# Patient Record
Sex: FEMALE | Race: BLACK OR AFRICAN AMERICAN | NOT HISPANIC OR LATINO | URBAN - METROPOLITAN AREA
[De-identification: names, ages, dates, MRNs, and addresses within clinical notes are randomized per-mention and may not be internally consistent; named-entity substitution may affect disease eponyms.]

---

## 2021-04-05 ENCOUNTER — APPOINTMENT (RX ONLY)
Dept: URBAN - METROPOLITAN AREA CLINIC 34 | Facility: CLINIC | Age: 66
Setting detail: DERMATOLOGY
End: 2021-04-05

## 2021-04-05 DIAGNOSIS — L30.4 ERYTHEMA INTERTRIGO: ICD-10-CM

## 2021-04-05 PROCEDURE — ? COUNSELING

## 2021-04-05 PROCEDURE — 99203 OFFICE O/P NEW LOW 30 MIN: CPT

## 2021-04-05 PROCEDURE — ? ADDITIONAL NOTES

## 2021-04-05 PROCEDURE — ? PRESCRIPTION

## 2021-04-05 RX ORDER — NYSTATIN AND TRIAMCINOLONE ACETONIDE 100000; 1 [USP'U]/G; MG/G
OINTMENT TOPICAL
Qty: 1 | Refills: 0 | Status: ERX | COMMUNITY
Start: 2021-04-05

## 2021-04-05 RX ADMIN — NYSTATIN AND TRIAMCINOLONE ACETONIDE: 100000; 1 OINTMENT TOPICAL at 00:00

## 2021-04-05 ASSESSMENT — LOCATION ZONE DERM
LOCATION ZONE: TRUNK
LOCATION ZONE: TRUNK

## 2021-04-05 ASSESSMENT — LOCATION SIMPLE DESCRIPTION DERM
LOCATION SIMPLE: GLUTEAL CLEFT
LOCATION SIMPLE: GLUTEAL CLEFT

## 2021-04-05 ASSESSMENT — LOCATION DETAILED DESCRIPTION DERM
LOCATION DETAILED: GLUTEAL CLEFT
LOCATION DETAILED: GLUTEAL CLEFT

## 2021-04-05 NOTE — PROCEDURE: ADDITIONAL NOTES
Render Risk Assessment In Note?: no
Additional Notes: Notes it’s a frictional rash present at site. Recommends Zeasorb powder. Can send in nystatin-Triamcinolone to apply bid for two weeks.
Detail Level: Simple

## 2022-04-12 ENCOUNTER — OFFICE VISIT (OUTPATIENT)
Dept: INTERNAL MEDICINE CLINIC | Age: 67
End: 2022-04-12
Payer: MEDICARE

## 2022-04-12 VITALS
OXYGEN SATURATION: 98 % | BODY MASS INDEX: 42.51 KG/M2 | SYSTOLIC BLOOD PRESSURE: 143 MMHG | HEIGHT: 64 IN | TEMPERATURE: 97.9 F | WEIGHT: 249 LBS | DIASTOLIC BLOOD PRESSURE: 79 MMHG | HEART RATE: 83 BPM | RESPIRATION RATE: 14 BRPM

## 2022-04-12 DIAGNOSIS — Z00.00 ROUTINE ADULT HEALTH MAINTENANCE: ICD-10-CM

## 2022-04-12 DIAGNOSIS — K76.0 HEPATIC STEATOSIS: ICD-10-CM

## 2022-04-12 DIAGNOSIS — G89.29 CHRONIC PAIN OF BOTH KNEES: Primary | ICD-10-CM

## 2022-04-12 DIAGNOSIS — M25.562 CHRONIC PAIN OF BOTH KNEES: Primary | ICD-10-CM

## 2022-04-12 DIAGNOSIS — M25.561 CHRONIC PAIN OF BOTH KNEES: Primary | ICD-10-CM

## 2022-04-12 DIAGNOSIS — R01.1 HEART MURMUR: ICD-10-CM

## 2022-04-12 DIAGNOSIS — E78.49 OTHER HYPERLIPIDEMIA: ICD-10-CM

## 2022-04-12 DIAGNOSIS — M72.2 PLANTAR FASCIITIS: ICD-10-CM

## 2022-04-12 DIAGNOSIS — I10 PRIMARY HYPERTENSION: ICD-10-CM

## 2022-04-12 DIAGNOSIS — M79.644 PAIN OF FINGER OF RIGHT HAND: ICD-10-CM

## 2022-04-12 DIAGNOSIS — K44.9 HIATAL HERNIA: ICD-10-CM

## 2022-04-12 DIAGNOSIS — E11.9 TYPE 2 DIABETES MELLITUS WITHOUT COMPLICATION, WITHOUT LONG-TERM CURRENT USE OF INSULIN (HCC): ICD-10-CM

## 2022-04-12 DIAGNOSIS — Z12.11 SCREENING FOR COLON CANCER: ICD-10-CM

## 2022-04-12 PROCEDURE — G8536 NO DOC ELDER MAL SCRN: HCPCS | Performed by: INTERNAL MEDICINE

## 2022-04-12 PROCEDURE — 2022F DILAT RTA XM EVC RTNOPTHY: CPT | Performed by: INTERNAL MEDICINE

## 2022-04-12 PROCEDURE — 3046F HEMOGLOBIN A1C LEVEL >9.0%: CPT | Performed by: INTERNAL MEDICINE

## 2022-04-12 PROCEDURE — G8510 SCR DEP NEG, NO PLAN REQD: HCPCS | Performed by: INTERNAL MEDICINE

## 2022-04-12 PROCEDURE — G8754 DIAS BP LESS 90: HCPCS | Performed by: INTERNAL MEDICINE

## 2022-04-12 PROCEDURE — 1090F PRES/ABSN URINE INCON ASSESS: CPT | Performed by: INTERNAL MEDICINE

## 2022-04-12 PROCEDURE — G8417 CALC BMI ABV UP PARAM F/U: HCPCS | Performed by: INTERNAL MEDICINE

## 2022-04-12 PROCEDURE — 3017F COLORECTAL CA SCREEN DOC REV: CPT | Performed by: INTERNAL MEDICINE

## 2022-04-12 PROCEDURE — G8400 PT W/DXA NO RESULTS DOC: HCPCS | Performed by: INTERNAL MEDICINE

## 2022-04-12 PROCEDURE — G8427 DOCREV CUR MEDS BY ELIG CLIN: HCPCS | Performed by: INTERNAL MEDICINE

## 2022-04-12 PROCEDURE — 99204 OFFICE O/P NEW MOD 45 MIN: CPT | Performed by: INTERNAL MEDICINE

## 2022-04-12 PROCEDURE — 1101F PT FALLS ASSESS-DOCD LE1/YR: CPT | Performed by: INTERNAL MEDICINE

## 2022-04-12 PROCEDURE — G8753 SYS BP > OR = 140: HCPCS | Performed by: INTERNAL MEDICINE

## 2022-04-12 RX ORDER — ATORVASTATIN CALCIUM 20 MG/1
20 TABLET, FILM COATED ORAL DAILY
COMMUNITY
End: 2022-04-12 | Stop reason: SDUPTHER

## 2022-04-12 RX ORDER — AMLODIPINE BESYLATE 10 MG/1
10 TABLET ORAL DAILY
Qty: 90 TABLET | Refills: 3 | Status: SHIPPED | OUTPATIENT
Start: 2022-04-12

## 2022-04-12 RX ORDER — METFORMIN HYDROCHLORIDE 500 MG/1
500 TABLET, EXTENDED RELEASE ORAL
COMMUNITY
End: 2022-04-12 | Stop reason: SDUPTHER

## 2022-04-12 RX ORDER — DICLOFENAC SODIUM 75 MG/1
75 TABLET, DELAYED RELEASE ORAL
Qty: 60 TABLET | Refills: 3 | Status: SHIPPED | OUTPATIENT
Start: 2022-04-12 | End: 2022-05-16

## 2022-04-12 RX ORDER — AMLODIPINE BESYLATE 10 MG/1
10 TABLET ORAL DAILY
COMMUNITY
End: 2022-04-12 | Stop reason: SDUPTHER

## 2022-04-12 RX ORDER — ATORVASTATIN CALCIUM 20 MG/1
20 TABLET, FILM COATED ORAL DAILY
Qty: 90 TABLET | Refills: 3 | Status: SHIPPED | OUTPATIENT
Start: 2022-04-12

## 2022-04-12 RX ORDER — LOSARTAN POTASSIUM AND HYDROCHLOROTHIAZIDE 25; 100 MG/1; MG/1
1 TABLET ORAL DAILY
Qty: 90 TABLET | Refills: 3 | Status: SHIPPED | OUTPATIENT
Start: 2022-04-12

## 2022-04-12 RX ORDER — METFORMIN HYDROCHLORIDE 500 MG/1
500 TABLET, EXTENDED RELEASE ORAL
Qty: 90 TABLET | Refills: 3 | Status: SHIPPED | OUTPATIENT
Start: 2022-04-12 | End: 2022-05-10 | Stop reason: SDUPTHER

## 2022-04-12 RX ORDER — LOSARTAN POTASSIUM AND HYDROCHLOROTHIAZIDE 25; 100 MG/1; MG/1
1 TABLET ORAL DAILY
COMMUNITY
End: 2022-04-12 | Stop reason: SDUPTHER

## 2022-04-12 NOTE — ASSESSMENT & PLAN NOTE
Jammed her finger a few weeks ago.   Still has swelling and difficulty fully extending DIP joint  Ortho referral provided

## 2022-04-12 NOTE — ASSESSMENT & PLAN NOTE
Metformin  qhs  Recent A1c 6.7 per patient. Was just started on Metformin. Continue Metformin 500 ER every night.   We discussed considering medications likely also help with weight

## 2022-04-12 NOTE — PROGRESS NOTES
Assessment and Plan     1. Chronic pain of both knees  Assessment & Plan:  Knee pain - has been told to lose weight and walk but hard to walk due to pain   Had a steroid shot 3 years ago in both knees   Has done PT previously - did water therapy which helped a lot; done in 2018  Limits her activities  Affects her sleep   voltaren gel doesn't help  Tylenol arthritis doesn't help, otc meds don't help   Recommend trial diclofenac. [facility] paper prescription provided for water therapy. Referral for orthopedics for reevaluation provided  Orders:  -     diclofenac EC (VOLTAREN) 75 mg EC tablet; Take 1 Tablet by mouth two (2) times daily as needed for Pain., Normal, Disp-60 Tablet, R-3  -     REFERRAL TO ORTHOPEDICS  2. Pain of finger of right hand  Assessment & Plan:  Jammed her finger a few weeks ago. Still has swelling and difficulty fully extending DIP joint  Ortho referral provided  Orders:  -     REFERRAL TO ORTHOPEDICS  3. Screening for colon cancer  -     REFERRAL TO GASTROENTEROLOGY  4. Primary hypertension  Assessment & Plan:   amlo 10  Losartan hctz 100/25  Elevated today. Recommend monitoring at home and sending me readings. Adjust medications if needed. On max doses of medications already  5. Other hyperlipidemia  Assessment & Plan:   unclear control, continue current medications pending work up below   Atorvastatin 20  6. Type 2 diabetes mellitus without complication, without long-term current use of insulin (HCC)  Assessment & Plan:   Metformin  qhs  Recent A1c 6.7 per patient. Was just started on Metformin. Continue Metformin 500 ER every night. We discussed considering medications likely also help with weight  7. Heart murmur  Assessment & Plan:  Told about this previously  Not appreciated on exam today. Monitor  8.  Routine adult health maintenance  Assessment & Plan:  Sister with colon cancer age 50  Last colonoscopy 3 years ago - was told to get it in 3 years which was last year Referral for colonoscopy provided  9. Hiatal hernia  Assessment & Plan:  Noted previously per patient. Monitor. Not currently on any medications  10. Hepatic steatosis  Assessment & Plan:  Previously noted per patient  11. Plantar fasciitis  Assessment & Plan:  Previously diagnosed, bilateral  Monitor     Benefits, risks, possible drug interactions, and side effects of all new medications were reviewed with the patient. Pt verbalized understanding. Return to clinic: 1 month for diabetes, blood pressure  Sister, family in town; from [address] was used to authenticate this note. Oran Ahumada, MD  [facility]  4/12/2022    Future Appointments   Date Time Provider Shaye Griffin   8/53/7377 34:59 AM Tiffani Car MD [facility] BS AMB        History of Present Illness   Chief Complaint   Establish care    Kim Durant is a 73 y.o. female         Review of Systems   Constitutional: Negative for chills and fever. HENT: Negative for hearing loss. Eyes: Negative for blurred vision. Respiratory: Negative for shortness of breath. Cardiovascular: Negative for chest pain. Gastrointestinal: Negative for abdominal pain, blood in stool, constipation, diarrhea, melena, nausea and vomiting. Genitourinary: Negative for dysuria and hematuria. Musculoskeletal: Positive for joint pain. Skin: Negative for rash. Neurological: Negative for headaches. Past Medical History   No Known Allergies     Current Outpatient Medications   Medication Sig    diclofenac EC (VOLTAREN) 75 mg EC tablet Take 1 Tablet by mouth two (2) times daily as needed for Pain.  metFORMIN ER (GLUCOPHAGE XR) 500 mg tablet Take 1 Tablet by mouth daily (with dinner). Indications: type 2 diabetes mellitus    amLODIPine (NORVASC) 10 mg tablet Take 1 Tablet by mouth daily.  Indications: high blood pressure    atorvastatin (LIPITOR) 20 mg tablet Take 1 Tablet by mouth daily. Indications: high cholesterol    losartan-hydroCHLOROthiazide (HYZAAR) 100-25 mg per tablet Take 1 Tablet by mouth daily. Indications: high blood pressure     No current facility-administered medications for this visit. Patient Active Problem List   Diagnosis Code    Primary hypertension I10    Other hyperlipidemia E78.49    Type 2 diabetes mellitus without complication, without long-term current use of insulin (HCC) E11.9    Chronic pain of both knees M25.561, M25.562, G89.29    Heart murmur R01.1    Routine adult health maintenance Z00.00    Hiatal hernia K44.9    Hepatic steatosis K76.0    Plantar fasciitis M72.2    Pain of finger of right hand M79.644     Past Surgical History:   Procedure Laterality Date    HX  SECTION  6260,7962    2     HX HEENT  7959    umbilical hernia surgery    HX HYSTERECTOMY      didn't get paps after hysterectomy; still has both ovaries    HX ORTHOPAEDIC  ,    right knee meniscus repair; 2nd surgery right knee arthroscopy       Social History     Tobacco Use    Smoking status: Never Smoker    Smokeless tobacco: Never Used   Substance Use Topics    Alcohol use: Yes     Comment: 1 glass of wine in a year      Family History   Problem Relation Age of Onset    Diabetes Mother     Hypertension Mother     Stroke Mother         3 strokes    Diabetes Father     Hypertension Father     Cancer Sister 50        colon cancer    Diabetes Brother     Hypertension Brother         Physical Exam   Vitals:       Visit Vitals  BP (!) 143/79 (BP 1 Location: Left upper arm, BP Patient Position: Sitting, BP Cuff Size: Adult)   Pulse 83   Temp 97.9 °F (36.6 °C) (Oral)   Resp 14   Ht 5' 4\" (1.626 m)   Wt 249 lb (112.9 kg)   SpO2 98%   BMI 42.74 kg/m²        Physical Exam  Constitutional:       General: She is not in acute distress. Appearance: She is well-developed.    HENT:      Right Ear: Tympanic membrane, ear canal and external ear normal.      Left Ear: Tympanic membrane, ear canal and external ear normal.   Eyes:      Extraocular Movements: Extraocular movements intact. Conjunctiva/sclera: Conjunctivae normal.   Cardiovascular:      Rate and Rhythm: Normal rate and regular rhythm. Pulses: Normal pulses. Heart sounds: No murmur heard. No friction rub. No gallop. Pulmonary:      Effort: No respiratory distress. Breath sounds: No wheezing, rhonchi or rales. Abdominal:      General: Bowel sounds are normal. There is no distension. Palpations: Abdomen is soft. There is no hepatomegaly, splenomegaly or mass. Tenderness: There is no abdominal tenderness. There is no guarding. Musculoskeletal:      Cervical back: Neck supple. Right lower leg: No edema. Left lower leg: No edema. Comments: Swelling noted at right third DIP joint   Skin:     General: Skin is warm. Findings: No rash. Neurological:      Mental Status: She is alert.

## 2022-04-12 NOTE — ASSESSMENT & PLAN NOTE
Sister with colon cancer age 50  Last colonoscopy 3 years ago - was told to get it in 3 years which was last year   Referral for colonoscopy provided

## 2022-04-12 NOTE — ASSESSMENT & PLAN NOTE
Knee pain - has been told to lose weight and walk but hard to walk due to pain   Had a steroid shot 3 years ago in both knees   Has done PT previously - did water therapy which helped a lot; done in 2018  Limits her activities  Affects her sleep   voltaren gel doesn't help  Tylenol arthritis doesn't help, otc meds don't help   Recommend trial diclofenac. [facility] paper prescription provided for water therapy.   Referral for orthopedics for reevaluation provided

## 2022-04-12 NOTE — ASSESSMENT & PLAN NOTE
amlo 10  Losartan hctz 100/25  Elevated today. Recommend monitoring at home and sending me readings. Adjust medications if needed.   On max doses of medications already

## 2022-05-10 ENCOUNTER — OFFICE VISIT (OUTPATIENT)
Dept: INTERNAL MEDICINE CLINIC | Age: 67
End: 2022-05-10
Payer: MEDICARE

## 2022-05-10 VITALS
WEIGHT: 244 LBS | BODY MASS INDEX: 41.66 KG/M2 | OXYGEN SATURATION: 99 % | SYSTOLIC BLOOD PRESSURE: 147 MMHG | HEIGHT: 64 IN | TEMPERATURE: 97.3 F | HEART RATE: 88 BPM | DIASTOLIC BLOOD PRESSURE: 84 MMHG | RESPIRATION RATE: 14 BRPM

## 2022-05-10 DIAGNOSIS — I10 PRIMARY HYPERTENSION: ICD-10-CM

## 2022-05-10 DIAGNOSIS — E66.01 CLASS 3 SEVERE OBESITY WITH SERIOUS COMORBIDITY AND BODY MASS INDEX (BMI) OF 40.0 TO 44.9 IN ADULT, UNSPECIFIED OBESITY TYPE (HCC): ICD-10-CM

## 2022-05-10 DIAGNOSIS — E11.9 TYPE 2 DIABETES MELLITUS WITHOUT COMPLICATION, WITHOUT LONG-TERM CURRENT USE OF INSULIN (HCC): Primary | ICD-10-CM

## 2022-05-10 PROBLEM — E66.813 CLASS 3 SEVERE OBESITY WITH SERIOUS COMORBIDITY AND BODY MASS INDEX (BMI) OF 40.0 TO 44.9 IN ADULT: Status: ACTIVE | Noted: 2022-05-10

## 2022-05-10 LAB — HBA1C MFR BLD HPLC: 5.8 %

## 2022-05-10 PROCEDURE — 99214 OFFICE O/P EST MOD 30 MIN: CPT | Performed by: INTERNAL MEDICINE

## 2022-05-10 PROCEDURE — G8417 CALC BMI ABV UP PARAM F/U: HCPCS | Performed by: INTERNAL MEDICINE

## 2022-05-10 PROCEDURE — 3017F COLORECTAL CA SCREEN DOC REV: CPT | Performed by: INTERNAL MEDICINE

## 2022-05-10 PROCEDURE — G8400 PT W/DXA NO RESULTS DOC: HCPCS | Performed by: INTERNAL MEDICINE

## 2022-05-10 PROCEDURE — 1090F PRES/ABSN URINE INCON ASSESS: CPT | Performed by: INTERNAL MEDICINE

## 2022-05-10 PROCEDURE — G8753 SYS BP > OR = 140: HCPCS | Performed by: INTERNAL MEDICINE

## 2022-05-10 PROCEDURE — G8427 DOCREV CUR MEDS BY ELIG CLIN: HCPCS | Performed by: INTERNAL MEDICINE

## 2022-05-10 PROCEDURE — 1101F PT FALLS ASSESS-DOCD LE1/YR: CPT | Performed by: INTERNAL MEDICINE

## 2022-05-10 PROCEDURE — G8510 SCR DEP NEG, NO PLAN REQD: HCPCS | Performed by: INTERNAL MEDICINE

## 2022-05-10 PROCEDURE — 83036 HEMOGLOBIN GLYCOSYLATED A1C: CPT | Performed by: INTERNAL MEDICINE

## 2022-05-10 PROCEDURE — G8536 NO DOC ELDER MAL SCRN: HCPCS | Performed by: INTERNAL MEDICINE

## 2022-05-10 PROCEDURE — 3044F HG A1C LEVEL LT 7.0%: CPT | Performed by: INTERNAL MEDICINE

## 2022-05-10 PROCEDURE — G8754 DIAS BP LESS 90: HCPCS | Performed by: INTERNAL MEDICINE

## 2022-05-10 PROCEDURE — 2022F DILAT RTA XM EVC RTNOPTHY: CPT | Performed by: INTERNAL MEDICINE

## 2022-05-10 RX ORDER — METFORMIN HYDROCHLORIDE 500 MG/1
500 TABLET, EXTENDED RELEASE ORAL
Qty: 180 TABLET | Refills: 3 | Status: SHIPPED | OUTPATIENT
Start: 2022-05-10 | End: 2022-05-10

## 2022-05-10 RX ORDER — METFORMIN HYDROCHLORIDE 500 MG/1
500 TABLET, EXTENDED RELEASE ORAL 2 TIMES DAILY WITH MEALS
Qty: 180 TABLET | Refills: 3 | Status: SHIPPED | OUTPATIENT
Start: 2022-05-10

## 2022-05-10 NOTE — PROGRESS NOTES
Note   Chief Complaint   Diabetes    Karon Chavez is a 73 y.o. female     1. Type 2 diabetes mellitus without complication, without long-term current use of insulin ([facility])  Assessment & Plan:   Initially diagnosed in January and was started on metformin  Made diet changes after her diagnosis  Usually keeps to lean meats   Walks a couple times   Usually eats twice a day - breakfast and dinner  Fruits for snacks  Not much sugary drinks  A1c today 5.8%. Discussed GLP-1 to help with diabetes and weight. Also discussed increasing metformin. Agreeable to increase the metformin to 1 tablet twice a day. Consider GLP-1 in the future. No GLP-1 contraindications  Orders:  -     AMB POC HEMOGLOBIN A1C  -     metFORMIN ER (GLUCOPHAGE XR) 500 mg tablet; Take 1 Tablet by mouth two (2) times daily (with meals). Indications: type 2 diabetes mellitus, Normal, Disp-180 Tablet, R-3  2. Primary hypertension  Assessment & Plan:  Elevated again today. She would like to make lifestyle changes prior to medication changes  Continue losartan hydrochlorothiazide 100/25 and amlodipine 10 but if still elevated next visit, consider adding a medication  3. Class 3 severe obesity with serious comorbidity and body mass index (BMI) of 40.0 to 44.9 in adult, unspecified obesity type [facility])  Assessment & Plan:  She has been making a a lot of changes since her diabetes diagnosis earlier this year, has lost some weight since last visit  Discussed GLP-1 to help with diabetes and weight. Also discussed increasing metformin. Agreeable to increase the metformin to 1 tablet twice a day. Consider GLP-1 in the future. No GLP-1 contraindications  Check intake with up if possible  Discussed making sure to get enough protein, she does not eat much meat     Benefits, risks, possible drug interactions, and side effects of all new medications were reviewed with the patient. Pt verbalized understanding.     Return to clinic: 2 months for diabetes, blood pressure, weight, labs   Sister, family in town; from [address] [facility] was used to authenticate this note. Oran Ahumada, MD  [facility]  5/10/2022    Future Appointments   Date Time Provider Shaye Griffin   3/33/3392 95:67 AM Tiffani Car MD [facility] BS AMB        Objective   Vitals:       Visit Vitals  BP (!) 147/84 (BP 1 Location: Left upper arm, BP Patient Position: Sitting, BP Cuff Size: Adult)   Pulse 88   Temp 97.3 °F (36.3 °C) (Oral)   Resp 14   Ht 5' 4\" (1.626 m)   Wt 244 lb (110.7 kg)   SpO2 99%   BMI 41.88 kg/m²        Physical Exam  Constitutional:       Appearance: Normal appearance. She is not ill-appearing. Cardiovascular:      Rate and Rhythm: Normal rate and regular rhythm. Heart sounds: No murmur heard. No friction rub. No gallop. Pulmonary:      Effort: No respiratory distress. Breath sounds: Normal breath sounds. No wheezing, rhonchi or rales. Neurological:      Mental Status: She is alert. Current Outpatient Medications   Medication Sig    metFORMIN ER (GLUCOPHAGE XR) 500 mg tablet Take 1 Tablet by mouth two (2) times daily (with meals). Indications: type 2 diabetes mellitus    diclofenac EC (VOLTAREN) 75 mg EC tablet Take 1 Tablet by mouth two (2) times daily as needed for Pain.  amLODIPine (NORVASC) 10 mg tablet Take 1 Tablet by mouth daily. Indications: high blood pressure    atorvastatin (LIPITOR) 20 mg tablet Take 1 Tablet by mouth daily. Indications: high cholesterol    losartan-hydroCHLOROthiazide (HYZAAR) 100-25 mg per tablet Take 1 Tablet by mouth daily. Indications: high blood pressure     No current facility-administered medications for this visit.

## 2022-05-10 NOTE — ASSESSMENT & PLAN NOTE
Initially diagnosed in January and was started on metformin  Made diet changes after her diagnosis  Usually keeps to lean meats   Walks a couple times   Usually eats twice a day - breakfast and dinner  Fruits for snacks  Not much sugary drinks  A1c today 5.8%. Discussed GLP-1 to help with diabetes and weight. Also discussed increasing metformin. Agreeable to increase the metformin to 1 tablet twice a day. Consider GLP-1 in the future.   No GLP-1 contraindications

## 2022-05-10 NOTE — ASSESSMENT & PLAN NOTE
Elevated again today.   She would like to make lifestyle changes prior to medication changes  Continue losartan hydrochlorothiazide 100/25 and amlodipine 10 but if still elevated next visit, consider adding a medication

## 2022-05-10 NOTE — PATIENT INSTRUCTIONS
Apps:  Ania Martinez  Make sure to get at least 100g of protein daily  Increase metformin to 500 twice a day

## 2022-05-10 NOTE — ASSESSMENT & PLAN NOTE
She has been making a a lot of changes since her diabetes diagnosis earlier this year, has lost some weight since last visit  Discussed GLP-1 to help with diabetes and weight. Also discussed increasing metformin. Agreeable to increase the metformin to 1 tablet twice a day. Consider GLP-1 in the future.   No GLP-1 contraindications  Check intake with up if possible  Discussed making sure to get enough protein, she does not eat much meat

## 2022-05-12 PROBLEM — Z00.00 ROUTINE ADULT HEALTH MAINTENANCE: Status: RESOLVED | Noted: 2022-04-12 | Resolved: 2022-05-12

## 2022-05-15 DIAGNOSIS — M25.562 CHRONIC PAIN OF BOTH KNEES: ICD-10-CM

## 2022-05-15 DIAGNOSIS — G89.29 CHRONIC PAIN OF BOTH KNEES: ICD-10-CM

## 2022-05-15 DIAGNOSIS — M25.561 CHRONIC PAIN OF BOTH KNEES: ICD-10-CM

## 2022-05-16 RX ORDER — DICLOFENAC SODIUM 75 MG/1
TABLET, DELAYED RELEASE ORAL
Qty: 180 TABLET | Refills: 3 | Status: SHIPPED | OUTPATIENT
Start: 2022-05-16

## 2022-07-15 ENCOUNTER — OFFICE VISIT (OUTPATIENT)
Dept: INTERNAL MEDICINE CLINIC | Age: 67
End: 2022-07-15
Payer: MEDICARE

## 2022-07-15 VITALS
HEART RATE: 85 BPM | RESPIRATION RATE: 14 BRPM | DIASTOLIC BLOOD PRESSURE: 77 MMHG | OXYGEN SATURATION: 100 % | TEMPERATURE: 98.7 F | SYSTOLIC BLOOD PRESSURE: 128 MMHG | BODY MASS INDEX: 40.97 KG/M2 | HEIGHT: 64 IN | WEIGHT: 240 LBS

## 2022-07-15 DIAGNOSIS — R10.31 RIGHT LOWER QUADRANT ABDOMINAL PAIN: ICD-10-CM

## 2022-07-15 DIAGNOSIS — I10 PRIMARY HYPERTENSION: ICD-10-CM

## 2022-07-15 DIAGNOSIS — E66.01 CLASS 3 SEVERE OBESITY WITH SERIOUS COMORBIDITY AND BODY MASS INDEX (BMI) OF 40.0 TO 44.9 IN ADULT, UNSPECIFIED OBESITY TYPE (HCC): ICD-10-CM

## 2022-07-15 DIAGNOSIS — N89.8 VAGINAL DISCHARGE: Primary | ICD-10-CM

## 2022-07-15 DIAGNOSIS — E11.9 TYPE 2 DIABETES MELLITUS WITHOUT COMPLICATION, WITHOUT LONG-TERM CURRENT USE OF INSULIN (HCC): ICD-10-CM

## 2022-07-15 DIAGNOSIS — R20.0 NUMBNESS: ICD-10-CM

## 2022-07-15 PROCEDURE — G8510 SCR DEP NEG, NO PLAN REQD: HCPCS | Performed by: INTERNAL MEDICINE

## 2022-07-15 PROCEDURE — 2022F DILAT RTA XM EVC RTNOPTHY: CPT | Performed by: INTERNAL MEDICINE

## 2022-07-15 PROCEDURE — G8427 DOCREV CUR MEDS BY ELIG CLIN: HCPCS | Performed by: INTERNAL MEDICINE

## 2022-07-15 PROCEDURE — G8400 PT W/DXA NO RESULTS DOC: HCPCS | Performed by: INTERNAL MEDICINE

## 2022-07-15 PROCEDURE — 1090F PRES/ABSN URINE INCON ASSESS: CPT | Performed by: INTERNAL MEDICINE

## 2022-07-15 PROCEDURE — G8752 SYS BP LESS 140: HCPCS | Performed by: INTERNAL MEDICINE

## 2022-07-15 PROCEDURE — 3044F HG A1C LEVEL LT 7.0%: CPT | Performed by: INTERNAL MEDICINE

## 2022-07-15 PROCEDURE — G8417 CALC BMI ABV UP PARAM F/U: HCPCS | Performed by: INTERNAL MEDICINE

## 2022-07-15 PROCEDURE — 3017F COLORECTAL CA SCREEN DOC REV: CPT | Performed by: INTERNAL MEDICINE

## 2022-07-15 PROCEDURE — 99214 OFFICE O/P EST MOD 30 MIN: CPT | Performed by: INTERNAL MEDICINE

## 2022-07-15 PROCEDURE — 1101F PT FALLS ASSESS-DOCD LE1/YR: CPT | Performed by: INTERNAL MEDICINE

## 2022-07-15 PROCEDURE — G8536 NO DOC ELDER MAL SCRN: HCPCS | Performed by: INTERNAL MEDICINE

## 2022-07-15 PROCEDURE — G8754 DIAS BP LESS 90: HCPCS | Performed by: INTERNAL MEDICINE

## 2022-07-15 NOTE — ASSESSMENT & PLAN NOTE
Doing well with metformin  She is making a lot of dietary changes  Continue metformin  twice a day.   Plan to check next visit

## 2022-07-15 NOTE — ASSESSMENT & PLAN NOTE
Left foot numbness bottom of foot and sometimes left arm  No neck pain or back pain  Usually in the morning  Able to walk, no weakness   Likely related to nerve compression potentially related to how she is sleeping.   Check B12 with next labs

## 2022-07-15 NOTE — PROGRESS NOTES
Note   Chief Complaint   Follow-up    Denisse Boston is a 73 y.o. female     1. Vaginal discharge  Assessment & Plan:   Light brownish vaginal discharge x 2 years   Not large volume  Not itchy   No vaginal masses noted. History of hysterectomy. Swab obtained. If negative, likely her now normal discharge  Orders:  -     NUSWAB VAGINITIS + HSV; Future  2. Right lower quadrant abdominal pain  Assessment & Plan:  RLQ pain   Most recent episode started last 3 days  Seems to happen when eating salad   Gassy   Mild nausea the other day with some loose stools   Normal BM  Unclear etiology. Recommend monitoring and consider further work-up if worsening. Recommend increasing fluid intake. Possibly diet related  3. Numbness  Assessment & Plan:   Left foot numbness bottom of foot and sometimes left arm  No neck pain or back pain  Usually in the morning  Able to walk, no weakness   Likely related to nerve compression potentially related to how she is sleeping. Check B12 with next labs  4. Class 3 severe obesity with serious comorbidity and body mass index (BMI) of 40.0 to 44.9 in adult, unspecified obesity type [facility])  Assessment & Plan:  Working on diet changes  Has lost weight since April  Using loseit  Continue metformin  twice a day  5. Primary hypertension  Assessment & Plan:   well controlled, continue current medications   Losartan hydrochlorothiazide 100/25, amlodipine 10  6. Type 2 diabetes mellitus without complication, without long-term current use of insulin (HCC)  Assessment & Plan:  Doing well with metformin  She is making a lot of dietary changes  Continue metformin  twice a day. Plan to check next visit       Benefits, risks, possible drug interactions, and side effects of all new medications were reviewed with the patient. Pt verbalized understanding.     Return to clinic: 2 months for weight, diabetes   Sister, family in town; from [place]  7/15 - Planning to go to [address] [facility] to see family     An electronic signature was used to authenticate this note. Aylin Ware MD  [facility]  7/15/2022    Future Appointments   Date Time Provider Shaye Griffin   0/96/9752  2:07 AM Lucas Lewis MD [facility] BS AMB        Objective   Vitals:       Visit Vitals  /77 (BP 1 Location: Left upper arm, BP Patient Position: Sitting, BP Cuff Size: Adult)   Pulse 85   Temp 98.7 °F (37.1 °C) (Oral)   Resp 14   Ht 5' 4\" (1.626 m)   Wt 240 lb (108.9 kg)   SpO2 100%   BMI 41.20 kg/m²        Physical Exam  Constitutional:       Appearance: Normal appearance. She is not ill-appearing. Cardiovascular:      Rate and Rhythm: Normal rate and regular rhythm. Heart sounds: No murmur heard. No friction rub. No gallop. Pulmonary:      Effort: No respiratory distress. Breath sounds: Normal breath sounds. No wheezing, rhonchi or rales. Genitourinary:     Comments: Thin white discharge noted vaginal vault. No masses noted in vaginal vault  Neurological:      Mental Status: She is alert. Current Outpatient Medications   Medication Sig    diclofenac EC (VOLTAREN) 75 mg EC tablet TAKE 1 TABLET BY MOUTH  TWICE DAILY AS NEEDED FOR  PAIN    metFORMIN ER (GLUCOPHAGE XR) 500 mg tablet Take 1 Tablet by mouth two (2) times daily (with meals). Indications: type 2 diabetes mellitus    amLODIPine (NORVASC) 10 mg tablet Take 1 Tablet by mouth daily. Indications: high blood pressure    atorvastatin (LIPITOR) 20 mg tablet Take 1 Tablet by mouth daily. Indications: high cholesterol    losartan-hydroCHLOROthiazide (HYZAAR) 100-25 mg per tablet Take 1 Tablet by mouth daily. Indications: high blood pressure     No current facility-administered medications for this visit.

## 2022-07-15 NOTE — ASSESSMENT & PLAN NOTE
RLQ pain   Most recent episode started last 3 days  Seems to happen when eating salad   Gassy   Mild nausea the other day with some loose stools   Normal BM  Unclear etiology. Recommend monitoring and consider further work-up if worsening. Recommend increasing fluid intake.   Possibly diet related

## 2022-07-15 NOTE — ASSESSMENT & PLAN NOTE
Light brownish vaginal discharge x 2 years   Not large volume  Not itchy   No vaginal masses noted. History of hysterectomy. Swab obtained.   If negative, likely her now normal discharge

## 2022-07-20 LAB
A VAGINAE DNA VAG QL NAA+PROBE: NORMAL SCORE
BVAB2 DNA VAG QL NAA+PROBE: NORMAL SCORE
C ALBICANS DNA VAG QL NAA+PROBE: NEGATIVE
C GLABRATA DNA VAG QL NAA+PROBE: NEGATIVE
C TRACH RRNA SPEC QL NAA+PROBE: NEGATIVE
HSV1 DNA SPEC QL NAA+PROBE: NEGATIVE
HSV2 DNA SPEC QL NAA+PROBE: NEGATIVE
MEGA1 DNA VAG QL NAA+PROBE: NORMAL SCORE
N GONORRHOEA RRNA SPEC QL NAA+PROBE: NEGATIVE
SPECIMEN SOURCE: NORMAL
T VAGINALIS RRNA SPEC QL NAA+PROBE: NEGATIVE

## 2022-08-19 ENCOUNTER — OFFICE VISIT (OUTPATIENT)
Dept: INTERNAL MEDICINE CLINIC | Age: 67
End: 2022-08-19
Payer: MEDICARE

## 2022-08-19 VITALS
HEART RATE: 84 BPM | HEIGHT: 64 IN | WEIGHT: 240 LBS | DIASTOLIC BLOOD PRESSURE: 74 MMHG | OXYGEN SATURATION: 99 % | BODY MASS INDEX: 40.97 KG/M2 | TEMPERATURE: 98 F | RESPIRATION RATE: 14 BRPM | SYSTOLIC BLOOD PRESSURE: 120 MMHG

## 2022-08-19 DIAGNOSIS — N30.00 ACUTE CYSTITIS WITHOUT HEMATURIA: Primary | ICD-10-CM

## 2022-08-19 PROCEDURE — 3017F COLORECTAL CA SCREEN DOC REV: CPT | Performed by: INTERNAL MEDICINE

## 2022-08-19 PROCEDURE — G8427 DOCREV CUR MEDS BY ELIG CLIN: HCPCS | Performed by: INTERNAL MEDICINE

## 2022-08-19 PROCEDURE — G8752 SYS BP LESS 140: HCPCS | Performed by: INTERNAL MEDICINE

## 2022-08-19 PROCEDURE — G8400 PT W/DXA NO RESULTS DOC: HCPCS | Performed by: INTERNAL MEDICINE

## 2022-08-19 PROCEDURE — G8536 NO DOC ELDER MAL SCRN: HCPCS | Performed by: INTERNAL MEDICINE

## 2022-08-19 PROCEDURE — 99213 OFFICE O/P EST LOW 20 MIN: CPT | Performed by: INTERNAL MEDICINE

## 2022-08-19 PROCEDURE — G8754 DIAS BP LESS 90: HCPCS | Performed by: INTERNAL MEDICINE

## 2022-08-19 PROCEDURE — G8417 CALC BMI ABV UP PARAM F/U: HCPCS | Performed by: INTERNAL MEDICINE

## 2022-08-19 PROCEDURE — 1101F PT FALLS ASSESS-DOCD LE1/YR: CPT | Performed by: INTERNAL MEDICINE

## 2022-08-19 PROCEDURE — G8510 SCR DEP NEG, NO PLAN REQD: HCPCS | Performed by: INTERNAL MEDICINE

## 2022-08-19 PROCEDURE — 1090F PRES/ABSN URINE INCON ASSESS: CPT | Performed by: INTERNAL MEDICINE

## 2022-08-19 RX ORDER — NITROFURANTOIN 25; 75 MG/1; MG/1
100 CAPSULE ORAL 2 TIMES DAILY
Qty: 10 CAPSULE | Refills: 0 | Status: SHIPPED | OUTPATIENT
Start: 2022-08-19 | End: 2022-08-24

## 2022-08-19 RX ORDER — NITROFURANTOIN 25; 75 MG/1; MG/1
100 CAPSULE ORAL 2 TIMES DAILY
Qty: 10 CAPSULE | Refills: 0 | Status: SHIPPED | OUTPATIENT
Start: 2022-08-19 | End: 2022-08-19

## 2022-08-19 NOTE — ASSESSMENT & PLAN NOTE
Sx started Saturday  Has had similar episodes of UTI symptoms in the past and has tested negative  Also saw urology in the past - didn't see anything   Feels heavy, feels like bladder is going to fall out with occl pain   +urgency  No dysuria   Has occl bladder spasms in general - usually at night  No fevers, chills, nausea, vomiting, back pain   Ua with trace leuks. Given symptoms, will treat as UTI with macrobid, but if Ucx negative, possibly interstitial cystitis.  Rec pelvic floor PT

## 2022-08-19 NOTE — PROGRESS NOTES
Note   Chief Complaint   Uti symptoms    Charlie Valencia is a 73 y.o. female     1. Acute cystitis without hematuria  Assessment & Plan:   Sx started Saturday  Has had similar episodes of UTI symptoms in the past and has tested negative  Also saw urology in the past - didn't see anything   Feels heavy, feels like bladder is going to fall out with occl pain   +urgency  No dysuria   Has occl bladder spasms in general - usually at night  No fevers, chills, nausea, vomiting, back pain   Ua with trace leuks. Given symptoms, will treat as UTI with macrobid, but if Ucx negative, possibly interstitial cystitis. Rec pelvic floor PT  Orders:  -     CULTURE, URINE; Future  -     nitrofurantoin, macrocrystal-monohydrate, (MACROBID) 100 mg capsule; Take 1 Capsule by mouth two (2) times a day for 5 days. , Normal, Disp-10 Capsule, R-0     Benefits, risks, possible drug interactions, and side effects of all new medications were reviewed with the patient. Pt verbalized understanding. Return to clinic:  as scheduled   Sister, family in town; from [address] [facility] was used to authenticate this note. Radha Leach MD  [facility]  8/19/2022    Future Appointments   Date Time Provider Shaye Del Angeli   1/04/5042  5:66 AM Tommy Buenrostro MD [facility] BS AMB        Objective   Vitals:       Visit Vitals  /74 (BP 1 Location: Left upper arm, BP Patient Position: Sitting, BP Cuff Size: Adult)   Pulse 84   Temp 98 °F (36.7 °C) (Oral)   Resp 14   Ht 5' 4\" (1.626 m)   Wt 240 lb (108.9 kg)   SpO2 99%   BMI 41.20 kg/m²        Physical Exam  Constitutional:       Appearance: Normal appearance. She is not ill-appearing. Pulmonary:      Effort: No respiratory distress. Neurological:      Mental Status: She is alert.         Current Outpatient Medications   Medication Sig    nitrofurantoin, macrocrystal-monohydrate, (MACROBID) 100 mg capsule Take 1 Capsule by mouth two (2) times a day for 5 days. diclofenac EC (VOLTAREN) 75 mg EC tablet TAKE 1 TABLET BY MOUTH  TWICE DAILY AS NEEDED FOR  PAIN    metFORMIN ER (GLUCOPHAGE XR) 500 mg tablet Take 1 Tablet by mouth two (2) times daily (with meals). Indications: type 2 diabetes mellitus    amLODIPine (NORVASC) 10 mg tablet Take 1 Tablet by mouth daily. Indications: high blood pressure    atorvastatin (LIPITOR) 20 mg tablet Take 1 Tablet by mouth daily. Indications: high cholesterol    losartan-hydroCHLOROthiazide (HYZAAR) 100-25 mg per tablet Take 1 Tablet by mouth daily. Indications: high blood pressure     No current facility-administered medications for this visit.

## 2022-08-20 LAB
BACTERIA SPEC CULT: NORMAL
CC UR VC: NORMAL
COMMENT, HOLDF: NORMAL
SAMPLES BEING HELD,HOLD: NORMAL
SERVICE CMNT-IMP: NORMAL

## 2022-09-15 ENCOUNTER — OFFICE VISIT (OUTPATIENT)
Dept: INTERNAL MEDICINE CLINIC | Age: 67
End: 2022-09-15
Payer: MEDICARE

## 2022-09-15 VITALS
OXYGEN SATURATION: 98 % | DIASTOLIC BLOOD PRESSURE: 81 MMHG | RESPIRATION RATE: 18 BRPM | SYSTOLIC BLOOD PRESSURE: 132 MMHG | HEIGHT: 64 IN | TEMPERATURE: 97 F | BODY MASS INDEX: 41.48 KG/M2 | HEART RATE: 86 BPM | WEIGHT: 243 LBS

## 2022-09-15 DIAGNOSIS — E11.9 TYPE 2 DIABETES MELLITUS WITHOUT COMPLICATION, WITHOUT LONG-TERM CURRENT USE OF INSULIN (HCC): Primary | ICD-10-CM

## 2022-09-15 DIAGNOSIS — E66.01 CLASS 3 SEVERE OBESITY WITH SERIOUS COMORBIDITY AND BODY MASS INDEX (BMI) OF 40.0 TO 44.9 IN ADULT, UNSPECIFIED OBESITY TYPE (HCC): ICD-10-CM

## 2022-09-15 DIAGNOSIS — R20.0 NUMBNESS: ICD-10-CM

## 2022-09-15 DIAGNOSIS — Z11.59 NEED FOR HEPATITIS C SCREENING TEST: ICD-10-CM

## 2022-09-15 DIAGNOSIS — L30.9 DERMATITIS: ICD-10-CM

## 2022-09-15 DIAGNOSIS — Z12.31 ENCOUNTER FOR SCREENING MAMMOGRAM FOR MALIGNANT NEOPLASM OF BREAST: ICD-10-CM

## 2022-09-15 DIAGNOSIS — Z00.00 ROUTINE ADULT HEALTH MAINTENANCE: ICD-10-CM

## 2022-09-15 DIAGNOSIS — Z78.0 POST-MENOPAUSAL: ICD-10-CM

## 2022-09-15 PROCEDURE — G8752 SYS BP LESS 140: HCPCS | Performed by: INTERNAL MEDICINE

## 2022-09-15 PROCEDURE — 3044F HG A1C LEVEL LT 7.0%: CPT | Performed by: INTERNAL MEDICINE

## 2022-09-15 PROCEDURE — 3017F COLORECTAL CA SCREEN DOC REV: CPT | Performed by: INTERNAL MEDICINE

## 2022-09-15 PROCEDURE — 2022F DILAT RTA XM EVC RTNOPTHY: CPT | Performed by: INTERNAL MEDICINE

## 2022-09-15 PROCEDURE — G8754 DIAS BP LESS 90: HCPCS | Performed by: INTERNAL MEDICINE

## 2022-09-15 PROCEDURE — 1090F PRES/ABSN URINE INCON ASSESS: CPT | Performed by: INTERNAL MEDICINE

## 2022-09-15 PROCEDURE — G8536 NO DOC ELDER MAL SCRN: HCPCS | Performed by: INTERNAL MEDICINE

## 2022-09-15 PROCEDURE — G8400 PT W/DXA NO RESULTS DOC: HCPCS | Performed by: INTERNAL MEDICINE

## 2022-09-15 PROCEDURE — G9899 SCRN MAM PERF RSLTS DOC: HCPCS | Performed by: INTERNAL MEDICINE

## 2022-09-15 PROCEDURE — G8510 SCR DEP NEG, NO PLAN REQD: HCPCS | Performed by: INTERNAL MEDICINE

## 2022-09-15 PROCEDURE — G8417 CALC BMI ABV UP PARAM F/U: HCPCS | Performed by: INTERNAL MEDICINE

## 2022-09-15 PROCEDURE — 1101F PT FALLS ASSESS-DOCD LE1/YR: CPT | Performed by: INTERNAL MEDICINE

## 2022-09-15 PROCEDURE — G8427 DOCREV CUR MEDS BY ELIG CLIN: HCPCS | Performed by: INTERNAL MEDICINE

## 2022-09-15 PROCEDURE — 99214 OFFICE O/P EST MOD 30 MIN: CPT | Performed by: INTERNAL MEDICINE

## 2022-09-15 RX ORDER — NYSTATIN AND TRIAMCINOLONE ACETONIDE 100000; 1 [USP'U]/G; MG/G
OINTMENT TOPICAL
Qty: 30 G | Refills: 1 | Status: SHIPPED | OUTPATIENT
Start: 2022-09-15 | End: 2022-09-16 | Stop reason: SDUPTHER

## 2022-09-15 NOTE — ASSESSMENT & PLAN NOTE
working on lifestyle changes   10 min walking breaks throughout the day  Eats a lot of veggies  Doesn't do much red meat, does mostly chicken and fish  Doesn't snack  Drinks a lot of water   Cut way down on sweets; avoids processed and fast foods  Does fruit   No SE with metformin currently although had GI SE at the beginning   Brought up ozempic but wants to stay where she's at and work on lifestyle changes.   Cont metformin 500 XR BID  Starting weight 249 4/2022

## 2022-09-15 NOTE — PROGRESS NOTES
Note   Chief Complaint   Follow-up    Serafin Coombs is a 78 y.o. female     1. Type 2 diabetes mellitus without complication, without long-term current use of insulin ([address]  Assessment & Plan:   unclear control, continue current medications pending work up below   Metformin  twice a day  Has seen an eye doctor in the past year  Orders:  -     CBC W/O DIFF; Future  -     METABOLIC PANEL, COMPREHENSIVE; Future  -     HEMOGLOBIN A1C WITH EAG; Future  -     LIPID PANEL; Future  -     MICROALBUMIN, UR, RAND W/ MICROALB/CREAT RATIO; Future  2. Numbness  Assessment & Plan:   Still having some symptoms,Check b12  Orders:  -     VITAMIN B12; Future  -     TSH 3RD GENERATION; Future  -     T4, FREE; Future  3. Need for hepatitis C screening test  -     HEPATITIS C AB; Future  4. Encounter for screening mammogram for malignant neoplasm of breast  -     [facility] 3D DAVDI W MAMMO BI SCREENING INCL CAD; Future  5. Post-menopausal  -     DEXA BONE DENSITY STUDY AXIAL; Future  6. Dermatitis  -     nystatin-triamcinolone (MYCOLOG) 100,000-0.1 unit/gram-% ointment; Apply  to affected area two (2) times daily as needed for Skin Irritation. For rash, Normal, Disp-30 g, R-1  7. Class 3 severe obesity with serious comorbidity and body mass index (BMI) of 40.0 to 44.9 in adult, unspecified obesity type [facility])  Assessment & Plan:  working on lifestyle changes   10 min walking breaks throughout the day  Eats a lot of veggies  Doesn't do much red meat, does mostly chicken and fish  Doesn't snack  Drinks a lot of water   Cut way down on sweets; avoids processed and fast foods  Does fruit   No SE with metformin currently although had GI SE at the beginning   Brought up ozempic but wants to stay where she's at and work on lifestyle changes. Cont metformin 500 XR BID  Starting weight 249 4/2022  8.  Routine adult health maintenance  Assessment & Plan:  DEXA ordered  Encouraged scheduling appointment for colonoscopy  Mammogram ordered Benefits, risks, possible drug interactions, and side effects of all new medications were reviewed with the patient. Pt verbalized understanding. 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 Leila Simpson MD  [facility]  9/15/2022    Future Appointments   Date Time Provider Shaye Del Angeli   90/05/8390  1:85 AM Jaki Barragan MD [facility] BS AMB        Objective   Vitals:       Visit Vitals  /81 (BP 1 Location: Left upper arm, BP Patient Position: Sitting, BP Cuff Size: Adult)   Pulse 86   Temp 97 °F (36.1 °C) (Temporal)   Resp 18   Ht 5' 4\" (1.626 m)   Wt 243 lb (110.2 kg)   SpO2 98%   BMI 41.71 kg/m²        Physical Exam  Constitutional:       Appearance: Normal appearance. She is not ill-appearing. Cardiovascular:      Rate and Rhythm: Normal rate and regular rhythm. Heart sounds: No murmur heard. No friction rub. No gallop. Pulmonary:      Effort: No respiratory distress. Breath sounds: Normal breath sounds. No wheezing, rhonchi or rales. Neurological:      Mental Status: She is alert. Current Outpatient Medications   Medication Sig    nystatin-triamcinolone (MYCOLOG) 100,000-0.1 unit/gram-% ointment Apply  to affected area two (2) times daily as needed for Skin Irritation. For rash    diclofenac EC (VOLTAREN) 75 mg EC tablet TAKE 1 TABLET BY MOUTH  TWICE DAILY AS NEEDED FOR  PAIN    metFORMIN ER (GLUCOPHAGE XR) 500 mg tablet Take 1 Tablet by mouth two (2) times daily (with meals). Indications: type 2 diabetes mellitus    amLODIPine (NORVASC) 10 mg tablet Take 1 Tablet by mouth daily.  Indications: high blood pressure    atorvastatin (LIPITOR) 20 mg tablet Take 1 Tablet by mouth daily. Indications: high cholesterol    losartan-hydroCHLOROthiazide (HYZAAR) 100-25 mg per tablet Take 1 Tablet by mouth daily. Indications: high blood pressure     No current facility-administered medications for this visit.

## 2022-09-15 NOTE — PROGRESS NOTES
Identified pt with two pt identifiers(name and ). Reviewed record in preparation for visit and have obtained necessary documentation. Chief Complaint   Patient presents with    Follow-up    Hypertension        Pt reports getting a flu shot yesterday 22     Vitals:    09/15/22 0837   BP: 132/81   Pulse: 86   Resp: 18   Temp: 97 °F (36.1 °C)   TempSrc: Temporal   SpO2: 98%   Weight: 243 lb (110.2 kg)   Height: 5' 4\" (1.626 m)   PainSc:   0 - No pain       Health Maintenance Due   Topic    Hepatitis C Screening     Pneumococcal 65+ years (1 - PCV)    Foot Exam Q1     MICROALBUMIN Q1     Eye Exam Retinal or Dilated     Lipid Screen     DTaP/Tdap/Td series (1 - Tdap)    Colorectal Cancer Screening Combo     Shingrix Vaccine Age 50> (1 of 2)    Breast Cancer Screen Mammogram     Bone Densitometry (Dexa) Screening     Medicare Yearly Exam     Flu Vaccine (1)       1. \"Have you been to the ER, urgent care clinic since your last visit? Hospitalized since your last visit? \" No    2. \"Have you seen or consulted any other health care providers outside of the [address] [place] [address] since your last visit? \" No     3. For patients over 45: Has the patient had a colonoscopy? No     If the patient is female:    4. For patients over 40: Has the patient had a mammogram? No    5. For patients over 21: Has the patient had a pap smear?  NA - based on age    Current Outpatient Medications   Medication Instructions    amLODIPine (NORVASC) 10 mg, Oral, DAILY    atorvastatin (LIPITOR) 20 mg, Oral, DAILY    diclofenac EC (VOLTAREN) 75 mg EC tablet TAKE 1 TABLET BY MOUTH  TWICE DAILY AS NEEDED FOR  PAIN    losartan-hydroCHLOROthiazide (HYZAAR) 100-25 mg per tablet 1 Tablet, Oral, DAILY    metFORMIN ER (GLUCOPHAGE XR) 500 mg, Oral, 2 TIMES DAILY WITH MEALS       No Known Allergies    Immunization History   Administered Date(s) Administered    COVID-19, MODERNA BLUE border, Primary or Immunocompromised, (age 18y+), IM, 100 mcg/0.5mL 03/27/2021, 04/14/2021, 11/06/2021    COVID-19, MODERNA Booster BLUE border, (age 18y+), IM, 50mcg/0.25mL 04/21/2022       No past medical history on file.

## 2022-09-15 NOTE — ASSESSMENT & PLAN NOTE
unclear control, continue current medications pending work up below   Metformin  twice a day  Has seen an eye doctor in the past year

## 2022-09-16 ENCOUNTER — TELEPHONE (OUTPATIENT)
Dept: INTERNAL MEDICINE CLINIC | Age: 67
End: 2022-09-16

## 2022-09-16 DIAGNOSIS — L30.9 DERMATITIS: ICD-10-CM

## 2022-09-16 LAB
ALBUMIN SERPL-MCNC: 3.9 G/DL (ref 3.5–5)
ALBUMIN/GLOB SERPL: 1.2 {RATIO} (ref 1.1–2.2)
ALP SERPL-CCNC: 116 U/L (ref 45–117)
ALT SERPL-CCNC: 26 U/L (ref 12–78)
ANION GAP SERPL CALC-SCNC: 5 MMOL/L (ref 5–15)
AST SERPL-CCNC: 17 U/L (ref 15–37)
BILIRUB SERPL-MCNC: 0.5 MG/DL (ref 0.2–1)
BUN SERPL-MCNC: 16 MG/DL (ref 6–20)
BUN/CREAT SERPL: 17 (ref 12–20)
CALCIUM SERPL-MCNC: 9.2 MG/DL (ref 8.5–10.1)
CHLORIDE SERPL-SCNC: 105 MMOL/L (ref 97–108)
CHOLEST SERPL-MCNC: 127 MG/DL
CO2 SERPL-SCNC: 29 MMOL/L (ref 21–32)
CREAT SERPL-MCNC: 0.95 MG/DL (ref 0.55–1.02)
CREAT UR-MCNC: 53.6 MG/DL
ERYTHROCYTE [DISTWIDTH] IN BLOOD BY AUTOMATED COUNT: 13.4 % (ref 11.5–14.5)
EST. AVERAGE GLUCOSE BLD GHB EST-MCNC: 114 MG/DL
GLOBULIN SER CALC-MCNC: 3.3 G/DL (ref 2–4)
GLUCOSE SERPL-MCNC: 126 MG/DL (ref 65–100)
HBA1C MFR BLD: 5.6 % (ref 4–5.6)
HCT VFR BLD AUTO: 37.9 % (ref 35–47)
HCV AB SERPL QL IA: NONREACTIVE
HDLC SERPL-MCNC: 47 MG/DL
HDLC SERPL: 2.7 {RATIO} (ref 0–5)
HGB BLD-MCNC: 11.8 G/DL (ref 11.5–16)
LDLC SERPL CALC-MCNC: 57.2 MG/DL (ref 0–100)
MCH RBC QN AUTO: 29.8 PG (ref 26–34)
MCHC RBC AUTO-ENTMCNC: 31.1 G/DL (ref 30–36.5)
MCV RBC AUTO: 95.7 FL (ref 80–99)
MICROALBUMIN UR-MCNC: <0.5 MG/DL
MICROALBUMIN/CREAT UR-RTO: <9 MG/G (ref 0–30)
NRBC # BLD: 0 K/UL (ref 0–0.01)
NRBC BLD-RTO: 0 PER 100 WBC
PLATELET # BLD AUTO: 316 K/UL (ref 150–400)
PMV BLD AUTO: 12 FL (ref 8.9–12.9)
POTASSIUM SERPL-SCNC: 4 MMOL/L (ref 3.5–5.1)
PROT SERPL-MCNC: 7.2 G/DL (ref 6.4–8.2)
RBC # BLD AUTO: 3.96 M/UL (ref 3.8–5.2)
SODIUM SERPL-SCNC: 139 MMOL/L (ref 136–145)
T4 FREE SERPL-MCNC: 1 NG/DL (ref 0.8–1.5)
TRIGL SERPL-MCNC: 114 MG/DL (ref ?–150)
TSH SERPL DL<=0.05 MIU/L-ACNC: 1.8 UIU/ML (ref 0.36–3.74)
VIT B12 SERPL-MCNC: 790 PG/ML (ref 193–986)
VLDLC SERPL CALC-MCNC: 22.8 MG/DL
WBC # BLD AUTO: 4.8 K/UL (ref 3.6–11)

## 2022-09-16 RX ORDER — NYSTATIN AND TRIAMCINOLONE ACETONIDE 100000; 1 [USP'U]/G; MG/G
OINTMENT TOPICAL
Qty: 60 G | Refills: 1 | Status: SHIPPED | OUTPATIENT
Start: 2022-09-16 | End: 2022-09-21 | Stop reason: CLARIF

## 2022-09-16 NOTE — TELEPHONE ENCOUNTER
Incoming fax 30 day supply for nystatin not covered. Insurance will pay for 90 days. Please resend for 90 days.

## 2022-09-18 NOTE — PROGRESS NOTES
[employer] message sent. HCV negative. No microalbuminuria. Thyroid studies normal.  LDL 57. A1c 5.6. CMP normal.  CBC normal.  B12 normal      Diabetes well controlled.   Continue metformin  LDL at goal.  Continue atorvastatin

## 2022-09-19 ENCOUNTER — TELEPHONE (OUTPATIENT)
Dept: INTERNAL MEDICINE CLINIC | Age: 67
End: 2022-09-19

## 2022-09-19 NOTE — TELEPHONE ENCOUNTER
[employer] is calling to state the patients MYCOLOG medication is not covered under insurance. A fax has been sent providing alternatives. Pharmacist states they did receive a reply and new script from our office but it was still for [facility] just a higher dose. Pharmacist would like a call back to discuss options. Please call [phone] reference number [id number].

## 2022-09-21 DIAGNOSIS — L30.9 DERMATITIS: Primary | ICD-10-CM

## 2022-09-21 RX ORDER — KETOCONAZOLE 20 MG/G
CREAM TOPICAL DAILY
Qty: 30 G | Refills: 5 | Status: SHIPPED | OUTPATIENT
Start: 2022-09-21

## 2022-09-21 RX ORDER — TRIAMCINOLONE ACETONIDE 1 MG/G
CREAM TOPICAL
Qty: 28.4 G | Refills: 5 | Status: SHIPPED | OUTPATIENT
Start: 2022-09-21

## 2022-09-21 NOTE — TELEPHONE ENCOUNTER
Nurse called patient  insurance for clarification from incoming fax that was receieved. Per Insurance will not cover mycolog please see alternatives that is covered by insurance. Ketoconazole 2%cream , clotrimazole 1% and betamethasone cream all covered with patient insurance. PA not available for mycolog will need one of the  alternative sent in for coverage. Please advise once sent.

## 2022-09-21 NOTE — TELEPHONE ENCOUNTER
Please advise pt - insurance not covering original rx.  Sent ketoconazole and triamcinolone creams (separate creams)

## 2022-10-21 ENCOUNTER — OFFICE VISIT (OUTPATIENT)
Dept: INTERNAL MEDICINE CLINIC | Age: 67
End: 2022-10-21
Payer: MEDICARE

## 2022-10-21 VITALS
HEART RATE: 81 BPM | OXYGEN SATURATION: 97 % | WEIGHT: 238.2 LBS | DIASTOLIC BLOOD PRESSURE: 79 MMHG | RESPIRATION RATE: 14 BRPM | HEIGHT: 64 IN | TEMPERATURE: 98 F | SYSTOLIC BLOOD PRESSURE: 130 MMHG | BODY MASS INDEX: 40.67 KG/M2

## 2022-10-21 DIAGNOSIS — K64.4 EXTERNAL HEMORRHOID: ICD-10-CM

## 2022-10-21 PROCEDURE — G9899 SCRN MAM PERF RSLTS DOC: HCPCS | Performed by: INTERNAL MEDICINE

## 2022-10-21 PROCEDURE — 99212 OFFICE O/P EST SF 10 MIN: CPT | Performed by: INTERNAL MEDICINE

## 2022-10-21 PROCEDURE — G8400 PT W/DXA NO RESULTS DOC: HCPCS | Performed by: INTERNAL MEDICINE

## 2022-10-21 PROCEDURE — 1090F PRES/ABSN URINE INCON ASSESS: CPT | Performed by: INTERNAL MEDICINE

## 2022-10-21 PROCEDURE — G8754 DIAS BP LESS 90: HCPCS | Performed by: INTERNAL MEDICINE

## 2022-10-21 PROCEDURE — G8427 DOCREV CUR MEDS BY ELIG CLIN: HCPCS | Performed by: INTERNAL MEDICINE

## 2022-10-21 PROCEDURE — G8417 CALC BMI ABV UP PARAM F/U: HCPCS | Performed by: INTERNAL MEDICINE

## 2022-10-21 PROCEDURE — G8510 SCR DEP NEG, NO PLAN REQD: HCPCS | Performed by: INTERNAL MEDICINE

## 2022-10-21 PROCEDURE — 1101F PT FALLS ASSESS-DOCD LE1/YR: CPT | Performed by: INTERNAL MEDICINE

## 2022-10-21 PROCEDURE — G8536 NO DOC ELDER MAL SCRN: HCPCS | Performed by: INTERNAL MEDICINE

## 2022-10-21 PROCEDURE — G8752 SYS BP LESS 140: HCPCS | Performed by: INTERNAL MEDICINE

## 2022-10-21 PROCEDURE — 3017F COLORECTAL CA SCREEN DOC REV: CPT | Performed by: INTERNAL MEDICINE

## 2022-10-21 NOTE — ASSESSMENT & PLAN NOTE
1 week ago   Anal area - needle pricking pain  Felt like rock sitting in stomach- attributed to beef she ate that day (generally doesn't tolerate beef well); pain has resolved   History of hemorrhoids   Sits a lot because works from home  +constipation, lifelong issue  ~almost 1cm hemorrhoid at 3pm noted. Rec preparation H as needed for pain, miralax for constipation. Counseled on treating constipation to prevent symptoms in the future.   Call if no improvement

## 2022-10-21 NOTE — PROGRESS NOTES
Note   Chief Complaint   Anal pain    Mateus Dowd is a 78 y.o. female     1. External hemorrhoid  Assessment & Plan:  1 week ago   Anal area - needle pricking pain  Felt like rock sitting in stomach- attributed to beef she ate that day (generally doesn't tolerate beef well); pain has resolved   History of hemorrhoids   Sits a lot because works from home  +constipation, lifelong issue  ~almost 1cm hemorrhoid at 3pm noted. Rec preparation H as needed for pain, miralax for constipation. Counseled on treating constipation to prevent symptoms in the future. Call if no improvement     Benefits, risks, possible drug interactions, and side effects of all new medications were reviewed with the patient. Pt verbalized understanding. 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 Anusha Davies MD  [facility]  10/21/2022    Future Appointments   Date Time Provider Shaye Girffin   10/27/2022  9:45 AM [facility]   10/27/2022 10:30 AM [facility] DEXA 1 [facility]   63/17/9106  3:62 AM Sathya Leung MD [facility] BS AMB        Objective   Vitals:       Visit Vitals  /79 (BP 1 Location: Left upper arm, BP Patient Position: Sitting, BP Cuff Size: Small adult)   Pulse 81   Temp 98 °F (36.7 °C) (Oral)   Resp 14   Ht 5' 4\" (1.626 m)   Wt 238 lb 3.2 oz (108 kg)   SpO2 97%   BMI 40.89 kg/m²        Physical Exam  Constitutional:       Appearance: Normal appearance. She is not ill-appearing. Pulmonary:      Effort: No respiratory distress. Genitourinary:     Comments: ~almost 1cm hemorrhoid at 3pm noted, nonbleeding  Neurological:      Mental Status: She is alert.         Current Outpatient Medications Medication Sig    ketoconazole (NIZORAL) 2 % topical cream Apply  to affected area daily. triamcinolone acetonide (KENALOG) 0.1 % topical cream Apply  to affected area two (2) times daily as needed for Skin Irritation. use thin layer    diclofenac EC (VOLTAREN) 75 mg EC tablet TAKE 1 TABLET BY MOUTH  TWICE DAILY AS NEEDED FOR  PAIN    metFORMIN ER (GLUCOPHAGE XR) 500 mg tablet Take 1 Tablet by mouth two (2) times daily (with meals). Indications: type 2 diabetes mellitus    amLODIPine (NORVASC) 10 mg tablet Take 1 Tablet by mouth daily. Indications: high blood pressure    atorvastatin (LIPITOR) 20 mg tablet Take 1 Tablet by mouth daily. Indications: high cholesterol    losartan-hydroCHLOROthiazide (HYZAAR) 100-25 mg per tablet Take 1 Tablet by mouth daily. Indications: high blood pressure     No current facility-administered medications for this visit.

## 2022-10-27 ENCOUNTER — HOSPITAL ENCOUNTER (OUTPATIENT)
Dept: MAMMOGRAPHY | Age: 67
Discharge: HOME | End: 2022-10-27
Attending: INTERNAL MEDICINE
Payer: MEDICARE

## 2022-10-27 DIAGNOSIS — Z78.0 POST-MENOPAUSAL: ICD-10-CM

## 2022-10-27 DIAGNOSIS — Z12.31 ENCOUNTER FOR SCREENING MAMMOGRAM FOR MALIGNANT NEOPLASM OF BREAST: ICD-10-CM

## 2022-10-27 PROCEDURE — 77063 BREAST TOMOSYNTHESIS BI: CPT

## 2022-10-27 PROCEDURE — 77080 DXA BONE DENSITY AXIAL: CPT

## 2023-01-26 RX ORDER — LOSARTAN POTASSIUM AND HYDROCHLOROTHIAZIDE 25; 100 MG/1; MG/1
TABLET ORAL
Qty: 90 TABLET | Refills: 3 | Status: SHIPPED | OUTPATIENT
Start: 2023-01-26

## 2023-01-26 RX ORDER — ATORVASTATIN CALCIUM 20 MG/1
TABLET, FILM COATED ORAL
Qty: 90 TABLET | Refills: 3 | Status: SHIPPED | OUTPATIENT
Start: 2023-01-26

## 2023-01-26 RX ORDER — AMLODIPINE BESYLATE 10 MG/1
TABLET ORAL
Qty: 90 TABLET | Refills: 3 | Status: SHIPPED | OUTPATIENT
Start: 2023-01-26

## 2023-02-20 ENCOUNTER — OFFICE VISIT (OUTPATIENT)
Dept: INTERNAL MEDICINE CLINIC | Age: 68
End: 2023-02-20
Payer: MEDICARE

## 2023-02-20 VITALS
BODY MASS INDEX: 39.95 KG/M2 | DIASTOLIC BLOOD PRESSURE: 80 MMHG | HEART RATE: 95 BPM | OXYGEN SATURATION: 96 % | SYSTOLIC BLOOD PRESSURE: 131 MMHG | HEIGHT: 64 IN | RESPIRATION RATE: 18 BRPM | WEIGHT: 234 LBS | TEMPERATURE: 98 F

## 2023-02-20 DIAGNOSIS — M25.562 CHRONIC PAIN OF BOTH KNEES: ICD-10-CM

## 2023-02-20 DIAGNOSIS — E66.01 CLASS 3 SEVERE OBESITY WITH SERIOUS COMORBIDITY AND BODY MASS INDEX (BMI) OF 40.0 TO 44.9 IN ADULT, UNSPECIFIED OBESITY TYPE (HCC): ICD-10-CM

## 2023-02-20 DIAGNOSIS — Z12.11 SCREENING FOR COLON CANCER: ICD-10-CM

## 2023-02-20 DIAGNOSIS — G89.29 CHRONIC PAIN OF BOTH KNEES: ICD-10-CM

## 2023-02-20 DIAGNOSIS — M25.561 CHRONIC PAIN OF BOTH KNEES: ICD-10-CM

## 2023-02-20 DIAGNOSIS — M85.80 OSTEOPENIA, UNSPECIFIED LOCATION: ICD-10-CM

## 2023-02-20 DIAGNOSIS — E11.9 TYPE 2 DIABETES MELLITUS WITHOUT COMPLICATION, WITHOUT LONG-TERM CURRENT USE OF INSULIN (HCC): Primary | ICD-10-CM

## 2023-02-20 DIAGNOSIS — Z00.00 ROUTINE ADULT HEALTH MAINTENANCE: ICD-10-CM

## 2023-02-20 PROBLEM — N30.00 ACUTE CYSTITIS WITHOUT HEMATURIA: Status: RESOLVED | Noted: 2022-08-19 | Resolved: 2023-02-20

## 2023-02-20 PROCEDURE — G9899 SCRN MAM PERF RSLTS DOC: HCPCS | Performed by: INTERNAL MEDICINE

## 2023-02-20 PROCEDURE — 3046F HEMOGLOBIN A1C LEVEL >9.0%: CPT | Performed by: INTERNAL MEDICINE

## 2023-02-20 PROCEDURE — G8536 NO DOC ELDER MAL SCRN: HCPCS | Performed by: INTERNAL MEDICINE

## 2023-02-20 PROCEDURE — G8417 CALC BMI ABV UP PARAM F/U: HCPCS | Performed by: INTERNAL MEDICINE

## 2023-02-20 PROCEDURE — G8399 PT W/DXA RESULTS DOCUMENT: HCPCS | Performed by: INTERNAL MEDICINE

## 2023-02-20 PROCEDURE — G8427 DOCREV CUR MEDS BY ELIG CLIN: HCPCS | Performed by: INTERNAL MEDICINE

## 2023-02-20 PROCEDURE — 1101F PT FALLS ASSESS-DOCD LE1/YR: CPT | Performed by: INTERNAL MEDICINE

## 2023-02-20 PROCEDURE — G8510 SCR DEP NEG, NO PLAN REQD: HCPCS | Performed by: INTERNAL MEDICINE

## 2023-02-20 PROCEDURE — 2022F DILAT RTA XM EVC RTNOPTHY: CPT | Performed by: INTERNAL MEDICINE

## 2023-02-20 PROCEDURE — 99214 OFFICE O/P EST MOD 30 MIN: CPT | Performed by: INTERNAL MEDICINE

## 2023-02-20 PROCEDURE — 3017F COLORECTAL CA SCREEN DOC REV: CPT | Performed by: INTERNAL MEDICINE

## 2023-02-20 PROCEDURE — 1090F PRES/ABSN URINE INCON ASSESS: CPT | Performed by: INTERNAL MEDICINE

## 2023-02-20 RX ORDER — MELATONIN
DAILY
COMMUNITY

## 2023-02-20 NOTE — ASSESSMENT & PLAN NOTE
Doing significantly less carbs  Doing more fresh veggies, fruits   Walking a little more  Brought up possible med changes. She would like to keep meds the same. Continue healthy habit.  Cont metformin  BID  Starting weight 249 4/2022

## 2023-02-20 NOTE — PROGRESS NOTES
Note   Chief Complaint   Follow-up    Shonda Dougherty is a 78 y.o. female     1. Type 2 diabetes mellitus without complication, without long-term current use of insulin ([facility])  Assessment & Plan:   well controlled, continue current medications   Last A1c 5.6  Metformin  BID, check next visit  2. Routine adult health maintenance  Assessment & Plan:  Referral for colonoscopy provided  3. Screening for colon cancer  -     REFERRAL TO GASTROENTEROLOGY  4. Chronic pain of both knees  Assessment & Plan:  She is interested in being evaluated for a knee replacement  Referral provided for ortho  Orders:  -     REFERRAL TO ORTHOPEDICS  5. Class 3 severe obesity with serious comorbidity and body mass index (BMI) of 40.0 to 44.9 in adult, unspecified obesity type [facility])  Assessment & Plan:  Doing significantly less carbs  Doing more fresh veggies, fruits   Walking a little more  Brought up possible med changes. She would like to keep meds the same. Continue healthy habit. Cont metformin  BID  Starting weight 249 4/2022  6. Osteopenia, unspecified location  Assessment & Plan:   Noted on last DEXA. She is taking a vitamin D supplement now, increasing calcium in diet. Recommend weightbearing exercises     Benefits, risks, possible drug interactions, and side effects of all new medications were reviewed with the patient. Pt verbalized understanding. 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   Norma Vidal MD  [facility]  2/20/2023    Future Appointments   Date Time Provider Shaye Griffin   6/52/9679  8:98 AM Cindy Saha MD [facility] BS AMB        Objective   Vitals:       Visit Vitals  /80 (BP 1 Location: Left upper arm, BP Patient Position: Sitting, BP Cuff Size: Adult)   Pulse 95   Temp 98 °F (36.7 °C) (Oral)   Resp 18   Ht 5' 4\" (1.626 m)   Wt 234 lb (106.1 kg)   SpO2 96%   BMI 40.17 kg/m²        Physical Exam  Constitutional:       Appearance: Normal appearance. She is not ill-appearing. Cardiovascular:      Rate and Rhythm: Normal rate and regular rhythm. Heart sounds: No murmur heard. No friction rub. No gallop. Pulmonary:      Effort: No respiratory distress. Breath sounds: Normal breath sounds. No wheezing, rhonchi or rales. Neurological:      Mental Status: She is alert. Current Outpatient Medications   Medication Sig    cholecalciferol (VITAMIN D3) (1000 Units /25 mcg) tablet Take  by mouth daily. losartan-hydroCHLOROthiazide (HYZAAR) 100-25 mg per tablet TAKE 1 TABLET BY MOUTH  DAILY FOR HIGH BLOOD  PRESSURE    amLODIPine (NORVASC) 10 mg tablet TAKE 1 TABLET BY MOUTH  DAILY FOR HIGH BLOOD  PRESSURE    atorvastatin (LIPITOR) 20 mg tablet TAKE 1 TABLET BY MOUTH  DAILY FOR HIGH CHOLESTEROL    ketoconazole (NIZORAL) 2 % topical cream Apply  to affected area daily. triamcinolone acetonide (KENALOG) 0.1 % topical cream Apply  to affected area two (2) times daily as needed for Skin Irritation. use thin layer    diclofenac EC (VOLTAREN) 75 mg EC tablet TAKE 1 TABLET BY MOUTH  TWICE DAILY AS NEEDED FOR  PAIN    metFORMIN ER (GLUCOPHAGE XR) 500 mg tablet Take 1 Tablet by mouth two (2) times daily (with meals). Indications: type 2 diabetes mellitus     No current facility-administered medications for this visit.

## 2023-02-20 NOTE — ASSESSMENT & PLAN NOTE
Noted on last DEXA. She is taking a vitamin D supplement now, increasing calcium in diet.   Recommend weightbearing exercises

## 2023-02-20 NOTE — PROGRESS NOTES
Room:  Identified pt with two pt identifiers(name and ). Reviewed record in preparation for visit and have obtained necessary documentation. Chief Complaint   Patient presents with    Follow-up    Hypertension          Vitals:    23 1135   BP: 131/80   Pulse: 95   Resp: 18   Temp: 98 °F (36.7 °C)   TempSrc: Oral   SpO2: 96%   Weight: 234 lb (106.1 kg)   Height: 5' 4\" (1.626 m)   PainSc:   0 - No pain       Health Maintenance Due   Topic    Pneumococcal 65+ years (1 - PCV)    Eye Exam Retinal or Dilated     DTaP/Tdap/Td series (1 - Tdap)    Colorectal Cancer Screening Combo     Shingles Vaccine (1 of 2)    Medicare Yearly Exam     COVID-19 Vaccine (5 - Booster)       1. \"Have you been to the ER, urgent care clinic since your last visit? Hospitalized since your last visit? \" No    2. \"Have you seen or consulted any other health care providers outside of the [address] [place] [address] since your last visit? \" No     3. For patients over 45: Has the patient had a colonoscopy? No     If the patient is female:    4. For patients over 40: Has the patient had a mammogram? Yes - no Care Gap present    5. For patients over 21: Has the patient had a pap smear?  Yes - no Care Gap present    Current Outpatient Medications   Medication Instructions    amLODIPine (NORVASC) 10 mg tablet TAKE 1 TABLET BY MOUTH  DAILY FOR HIGH BLOOD  PRESSURE    atorvastatin (LIPITOR) 20 mg tablet TAKE 1 TABLET BY MOUTH  DAILY FOR HIGH CHOLESTEROL    diclofenac EC (VOLTAREN) 75 mg EC tablet TAKE 1 TABLET BY MOUTH  TWICE DAILY AS NEEDED FOR  PAIN    ketoconazole (NIZORAL) 2 % topical cream Topical, DAILY    losartan-hydroCHLOROthiazide (HYZAAR) 100-25 mg per tablet TAKE 1 TABLET BY MOUTH  DAILY FOR HIGH BLOOD  PRESSURE    metFORMIN ER (GLUCOPHAGE XR) 500 mg, Oral, 2 TIMES DAILY WITH MEALS    triamcinolone acetonide (KENALOG) 0.1 % topical cream Topical, 2 TIMES DAILY AS NEEDED, use thin layer       No Known Allergies    Immunization History Administered Date(s) Administered    COVID-19, MODERNA BLUE border, Primary or Immunocompromised, (age 18y+), IM, 100 mcg/0.5mL 03/27/2021, 04/14/2021, 11/06/2021    COVID-19, MODERNA Booster LEILANI border, (age 18y+), IM, 50mcg/0.25mL 04/21/2022    Influenza Vaccine 09/14/2022       No past medical history on file.

## 2023-02-20 NOTE — ASSESSMENT & PLAN NOTE
Needs apt. Last seen 2016   She is interested in being evaluated for a knee replacement  Referral provided for ortho

## 2023-05-02 ENCOUNTER — APPOINTMENT (OUTPATIENT)
Dept: GENERAL RADIOLOGY | Age: 68
End: 2023-05-02
Attending: STUDENT IN AN ORGANIZED HEALTH CARE EDUCATION/TRAINING PROGRAM
Payer: MEDICARE

## 2023-05-02 ENCOUNTER — HOSPITAL ENCOUNTER (EMERGENCY)
Age: 68
Discharge: HOME | End: 2023-05-02
Attending: STUDENT IN AN ORGANIZED HEALTH CARE EDUCATION/TRAINING PROGRAM
Payer: MEDICARE

## 2023-05-02 ENCOUNTER — APPOINTMENT (OUTPATIENT)
Dept: CT IMAGING | Age: 68
End: 2023-05-02
Attending: STUDENT IN AN ORGANIZED HEALTH CARE EDUCATION/TRAINING PROGRAM
Payer: MEDICARE

## 2023-05-02 VITALS
TEMPERATURE: 97.6 F | RESPIRATION RATE: 18 BRPM | SYSTOLIC BLOOD PRESSURE: 122 MMHG | OXYGEN SATURATION: 100 % | HEART RATE: 84 BPM | DIASTOLIC BLOOD PRESSURE: 65 MMHG

## 2023-05-02 DIAGNOSIS — R07.9 ACUTE CHEST PAIN: Primary | ICD-10-CM

## 2023-05-02 LAB
ALBUMIN SERPL-MCNC: 3.6 G/DL (ref 3.5–5)
ALBUMIN/GLOB SERPL: 0.9 (ref 1.1–2.2)
ALP SERPL-CCNC: 112 U/L (ref 45–117)
ALT SERPL-CCNC: 23 U/L (ref 12–78)
ANION GAP SERPL CALC-SCNC: 3 MMOL/L (ref 5–15)
AST SERPL-CCNC: 26 U/L (ref 15–37)
BASOPHILS # BLD: 0 K/UL (ref 0–0.1)
BASOPHILS NFR BLD: 1 % (ref 0–1)
BILIRUB SERPL-MCNC: 0.4 MG/DL (ref 0.2–1)
BNP SERPL-MCNC: 14 PG/ML
BUN SERPL-MCNC: 10 MG/DL (ref 6–20)
BUN/CREAT SERPL: 11 (ref 12–20)
CALCIUM SERPL-MCNC: 9.7 MG/DL (ref 8.5–10.1)
CHLORIDE SERPL-SCNC: 107 MMOL/L (ref 97–108)
CO2 SERPL-SCNC: 28 MMOL/L (ref 21–32)
COMMENT, HOLDF: NORMAL
CREAT SERPL-MCNC: 0.9 MG/DL (ref 0.55–1.02)
D DIMER PPP FEU-MCNC: 0.59 MG/L FEU (ref 0–0.65)
DIFFERENTIAL METHOD BLD: NORMAL
EOSINOPHIL # BLD: 0.1 K/UL (ref 0–0.4)
EOSINOPHIL NFR BLD: 1 % (ref 0–7)
ERYTHROCYTE [DISTWIDTH] IN BLOOD BY AUTOMATED COUNT: 12.8 % (ref 11.5–14.5)
GLOBULIN SER CALC-MCNC: 3.9 G/DL (ref 2–4)
GLUCOSE SERPL-MCNC: 91 MG/DL (ref 65–100)
HCT VFR BLD AUTO: 37.7 % (ref 35–47)
HGB BLD-MCNC: 12.4 G/DL (ref 11.5–16)
IMM GRANULOCYTES # BLD AUTO: 0 K/UL (ref 0–0.04)
IMM GRANULOCYTES NFR BLD AUTO: 0 % (ref 0–0.5)
INR PPP: 1 (ref 0.9–1.1)
LYMPHOCYTES # BLD: 1.9 K/UL (ref 0.8–3.5)
LYMPHOCYTES NFR BLD: 31 % (ref 12–49)
MAGNESIUM SERPL-MCNC: 2 MG/DL (ref 1.6–2.4)
MCH RBC QN AUTO: 30.2 PG (ref 26–34)
MCHC RBC AUTO-ENTMCNC: 32.9 G/DL (ref 30–36.5)
MCV RBC AUTO: 92 FL (ref 80–99)
MONOCYTES # BLD: 0.7 K/UL (ref 0–1)
MONOCYTES NFR BLD: 11 % (ref 5–13)
NEUTS SEG # BLD: 3.3 K/UL (ref 1.8–8)
NEUTS SEG NFR BLD: 56 % (ref 32–75)
NRBC # BLD: 0 K/UL (ref 0–0.01)
NRBC BLD-RTO: 0 PER 100 WBC
PLATELET # BLD AUTO: 306 K/UL (ref 150–400)
PMV BLD AUTO: 11.6 FL (ref 8.9–12.9)
POTASSIUM SERPL-SCNC: 4 MMOL/L (ref 3.5–5.1)
PROT SERPL-MCNC: 7.5 G/DL (ref 6.4–8.2)
PROTHROMBIN TIME: 10.9 SEC (ref 9–11.1)
RBC # BLD AUTO: 4.1 M/UL (ref 3.8–5.2)
SAMPLES BEING HELD,HOLD: NORMAL
SODIUM SERPL-SCNC: 138 MMOL/L (ref 136–145)
TROPONIN I SERPL HS-MCNC: 4 NG/L (ref 0–51)
TROPONIN I SERPL HS-MCNC: 5 NG/L (ref 0–51)
WBC # BLD AUTO: 5.9 K/UL (ref 3.6–11)

## 2023-05-02 PROCEDURE — 84484 ASSAY OF TROPONIN QUANT: CPT

## 2023-05-02 PROCEDURE — 36415 COLL VENOUS BLD VENIPUNCTURE: CPT

## 2023-05-02 PROCEDURE — 99285 EMERGENCY DEPT VISIT HI MDM: CPT

## 2023-05-02 PROCEDURE — 83880 ASSAY OF NATRIURETIC PEPTIDE: CPT

## 2023-05-02 PROCEDURE — 74011000636 HC RX REV CODE- 636: Performed by: STUDENT IN AN ORGANIZED HEALTH CARE EDUCATION/TRAINING PROGRAM

## 2023-05-02 PROCEDURE — 71275 CT ANGIOGRAPHY CHEST: CPT

## 2023-05-02 PROCEDURE — 85379 FIBRIN DEGRADATION QUANT: CPT

## 2023-05-02 PROCEDURE — 85610 PROTHROMBIN TIME: CPT

## 2023-05-02 PROCEDURE — 71046 X-RAY EXAM CHEST 2 VIEWS: CPT

## 2023-05-02 PROCEDURE — 80053 COMPREHEN METABOLIC PANEL: CPT

## 2023-05-02 PROCEDURE — 83735 ASSAY OF MAGNESIUM: CPT

## 2023-05-02 PROCEDURE — 93005 ELECTROCARDIOGRAM TRACING: CPT

## 2023-05-02 PROCEDURE — 85025 COMPLETE CBC W/AUTO DIFF WBC: CPT

## 2023-05-02 RX ADMIN — IOPAMIDOL 100 ML: 755 INJECTION, SOLUTION INTRAVENOUS at 14:28

## 2023-05-03 LAB
ATRIAL RATE: 90 BPM
CALCULATED P AXIS, ECG09: 59 DEGREES
CALCULATED R AXIS, ECG10: 29 DEGREES
CALCULATED T AXIS, ECG11: 63 DEGREES
DIAGNOSIS, 93000: NORMAL
P-R INTERVAL, ECG05: 160 MS
Q-T INTERVAL, ECG07: 352 MS
QRS DURATION, ECG06: 64 MS
QTC CALCULATION (BEZET), ECG08: 430 MS
VENTRICULAR RATE, ECG03: 90 BPM

## 2023-05-07 PROBLEM — R07.89 OTHER CHEST PAIN: Status: ACTIVE | Noted: 2023-05-07

## 2023-05-08 DIAGNOSIS — E11.9 TYPE 2 DIABETES MELLITUS WITHOUT COMPLICATION, WITHOUT LONG-TERM CURRENT USE OF INSULIN (HCC): Primary | ICD-10-CM

## 2023-05-08 RX ORDER — METFORMIN HYDROCHLORIDE 500 MG/1
500 TABLET, EXTENDED RELEASE ORAL 2 TIMES DAILY WITH MEALS
Qty: 180 TABLET | Refills: 3 | Status: SHIPPED | OUTPATIENT
Start: 2023-05-08

## 2023-05-09 ENCOUNTER — OFFICE VISIT (OUTPATIENT)
Age: 68
End: 2023-05-09
Payer: COMMERCIAL

## 2023-05-09 VITALS
HEART RATE: 96 BPM | SYSTOLIC BLOOD PRESSURE: 130 MMHG | BODY MASS INDEX: 40.8 KG/M2 | HEIGHT: 64 IN | WEIGHT: 239 LBS | OXYGEN SATURATION: 97 % | DIASTOLIC BLOOD PRESSURE: 70 MMHG

## 2023-05-09 DIAGNOSIS — R07.89 ATYPICAL CHEST PAIN: Primary | ICD-10-CM

## 2023-05-09 DIAGNOSIS — R60.9 EDEMA, UNSPECIFIED TYPE: ICD-10-CM

## 2023-05-09 DIAGNOSIS — R00.2 PALPITATIONS: ICD-10-CM

## 2023-05-09 DIAGNOSIS — I10 PRIMARY HYPERTENSION: ICD-10-CM

## 2023-05-09 DIAGNOSIS — E78.5 DYSLIPIDEMIA: ICD-10-CM

## 2023-05-09 DIAGNOSIS — E11.9 TYPE 2 DIABETES MELLITUS WITHOUT COMPLICATION, WITHOUT LONG-TERM CURRENT USE OF INSULIN (HCC): ICD-10-CM

## 2023-05-09 DIAGNOSIS — R01.1 MURMUR, CARDIAC: ICD-10-CM

## 2023-05-09 PROCEDURE — 1123F ACP DISCUSS/DSCN MKR DOCD: CPT

## 2023-05-09 PROCEDURE — 3017F COLORECTAL CA SCREEN DOC REV: CPT

## 2023-05-09 PROCEDURE — 3078F DIAST BP <80 MM HG: CPT

## 2023-05-09 PROCEDURE — 3075F SYST BP GE 130 - 139MM HG: CPT

## 2023-05-09 PROCEDURE — G8427 DOCREV CUR MEDS BY ELIG CLIN: HCPCS

## 2023-05-09 PROCEDURE — 3046F HEMOGLOBIN A1C LEVEL >9.0%: CPT

## 2023-05-09 PROCEDURE — G8399 PT W/DXA RESULTS DOCUMENT: HCPCS

## 2023-05-09 PROCEDURE — G8417 CALC BMI ABV UP PARAM F/U: HCPCS

## 2023-05-09 PROCEDURE — 2022F DILAT RTA XM EVC RTNOPTHY: CPT

## 2023-05-09 PROCEDURE — 1090F PRES/ABSN URINE INCON ASSESS: CPT

## 2023-05-09 PROCEDURE — 1036F TOBACCO NON-USER: CPT

## 2023-05-09 PROCEDURE — 99204 OFFICE O/P NEW MOD 45 MIN: CPT

## 2023-05-09 NOTE — PROGRESS NOTES
Kiera Bourgeois is a 78 y.o. female    had concerns including Follow-Up from Hospital (CP 5/2/2023 -ED). Vitals:    05/09/23 1027   BP: 130/70   Site: Left Upper Arm   Position: Sitting   Pulse: 96   SpO2: 97%   Weight: 239 lb (108.4 kg)   Height: 5' 4\" (1.626 m)        Chest pain patient states some chest pain on Wednesday     SOB no    Dizziness still having some dizziness if she walks too fast    Swelling no    Refills no    Covid Vaccinated yes      1. Have you been to the ER, urgent care clinic since your last visit? Hospitalized since your last visit? 5/2/2023    2. Have you seen or consulted any other health care providers outside of the [address] [place] [address] since your last visit? Include any pap smears or colon screening.  series completed

## 2023-05-11 ENCOUNTER — TELEPHONE (OUTPATIENT)
Age: 68
End: 2023-05-11

## 2023-05-11 NOTE — TELEPHONE ENCOUNTER
Enrolled with [employer] - Ordered and being shipped to patient's home address on file. ETA within 5-7 business days.         Message  Received: 2 days ago  KARLIE Calixto NP  7 day holter for palpitations please

## 2023-05-19 RX ORDER — DICLOFENAC SODIUM 75 MG/1
TABLET, DELAYED RELEASE ORAL
Qty: 180 TABLET | Refills: 3 | Status: SHIPPED | OUTPATIENT
Start: 2023-05-19

## 2023-10-12 NOTE — PROGRESS NOTES
Azeb Berrios MD., [facility]    [address] [facility] Vivian DUNLAP    Office [phone],PFQ [phone]           Colin Sanford is here for a f/u office visit. Primary care physician:  Haylee Puente MD    CC - as documented in EMR    Dear Dr. Haylee Puente MD    I had the pleasure of seeing Sarah Yung in the office today. Assessment:     History of atypical Chest pain/history of palpitations-resolved  Normal stress nuclear study 5/2023  Chronic LE edema/possible lymphedema  HTN  DM- on metformin  HLD  Obesity          Plan:      Normal Sridevi nuclear study 6/2023  Normal EF on echo 6/2023  Continue to monitor blood pressure/heart rate  Blood pressure controlled  Discussed about weight loss  Aggressive cardiovascular risk modification  Follow-up in 6 months/earlier as needed-     Patient understands the plan. All questions were answered to the patient's satisfaction. I appreciate the opportunity to be involved in Ms. Lema. See note below for details. Please do not hesitate to contact us with questions or concerns. Parul Mar MD      Cardiac Testing/ Procedures: A. Cardiac Cath/PCI:    B.ECHO/MARYCARMEN: 6/27/2023   Left Ventricle: Normal left ventricular systolic function with an estimated EF of 60 - 65%. Left ventricle size is normal. Increased wall thickness. Findings consistent with mild concentric hypertrophy. Normal wall motion. Grade I diastolic dysfunction. Aortic Valve: Trileaflet valve. Mild sclerosis of the aortic valve cusp. Mitral Valve: Trace regurgitation. Tricuspid Valve: Trace regurgitation. C.StressNuclear/Stress ECHO/Stress test: 6/28/2023-normal Sridevi nuclear study    D. Vascular:    E. EP:    F. Miscellaneous:    History:     Colin Sanford is a 80 y.o. female who returns for follow up visit. No CP/dyspnea  Palpitations-only occasionally occurs.   Chronic swelling lower

## 2023-10-13 ENCOUNTER — OFFICE VISIT (OUTPATIENT)
Age: 68
End: 2023-10-13

## 2023-10-13 VITALS
BODY MASS INDEX: 41.83 KG/M2 | WEIGHT: 245 LBS | HEIGHT: 64 IN | SYSTOLIC BLOOD PRESSURE: 134 MMHG | OXYGEN SATURATION: 98 % | HEART RATE: 83 BPM | DIASTOLIC BLOOD PRESSURE: 70 MMHG

## 2023-10-13 DIAGNOSIS — E66.01 SEVERE OBESITY (HCC): ICD-10-CM

## 2023-10-13 DIAGNOSIS — E78.5 DYSLIPIDEMIA: ICD-10-CM

## 2023-10-13 DIAGNOSIS — I10 PRIMARY HYPERTENSION: Primary | ICD-10-CM

## 2023-10-13 DIAGNOSIS — E11.9 TYPE 2 DIABETES MELLITUS WITHOUT COMPLICATION, WITHOUT LONG-TERM CURRENT USE OF INSULIN (HCC): ICD-10-CM

## 2023-10-14 SDOH — HEALTH STABILITY: PHYSICAL HEALTH: ON AVERAGE, HOW MANY MINUTES DO YOU ENGAGE IN EXERCISE AT THIS LEVEL?: 0 MIN

## 2023-10-14 SDOH — HEALTH STABILITY: PHYSICAL HEALTH: ON AVERAGE, HOW MANY DAYS PER WEEK DO YOU ENGAGE IN MODERATE TO STRENUOUS EXERCISE (LIKE A BRISK WALK)?: 0 DAYS

## 2023-10-14 ASSESSMENT — SOCIAL DETERMINANTS OF HEALTH (SDOH)

## 2023-10-17 ENCOUNTER — OFFICE VISIT (OUTPATIENT)
Facility: CLINIC | Age: 68
End: 2023-10-17
Payer: COMMERCIAL

## 2023-10-17 VITALS
HEIGHT: 64 IN | WEIGHT: 249 LBS | OXYGEN SATURATION: 97 % | BODY MASS INDEX: 42.51 KG/M2 | HEART RATE: 88 BPM | TEMPERATURE: 97.4 F | RESPIRATION RATE: 16 BRPM | DIASTOLIC BLOOD PRESSURE: 68 MMHG | SYSTOLIC BLOOD PRESSURE: 142 MMHG

## 2023-10-17 DIAGNOSIS — E78.5 DYSLIPIDEMIA: ICD-10-CM

## 2023-10-17 DIAGNOSIS — E11.9 TYPE 2 DIABETES MELLITUS WITHOUT COMPLICATION, WITHOUT LONG-TERM CURRENT USE OF INSULIN (HCC): Primary | ICD-10-CM

## 2023-10-17 DIAGNOSIS — H53.8 HAZY VISION: ICD-10-CM

## 2023-10-17 DIAGNOSIS — E66.01 CLASS 3 SEVERE OBESITY DUE TO EXCESS CALORIES WITH SERIOUS COMORBIDITY AND BODY MASS INDEX (BMI) OF 40.0 TO 44.9 IN ADULT (HCC): ICD-10-CM

## 2023-10-17 DIAGNOSIS — Z12.11 COLON CANCER SCREENING: ICD-10-CM

## 2023-10-17 DIAGNOSIS — R10.11 RIGHT UPPER QUADRANT ABDOMINAL PAIN: ICD-10-CM

## 2023-10-17 PROBLEM — R10.31 RIGHT LOWER QUADRANT ABDOMINAL PAIN: Status: RESOLVED | Noted: 2022-07-15 | Resolved: 2023-10-17

## 2023-10-17 PROBLEM — E66.813 CLASS 3 SEVERE OBESITY DUE TO EXCESS CALORIES WITH SERIOUS COMORBIDITY AND BODY MASS INDEX (BMI) OF 40.0 TO 44.9 IN ADULT: Status: ACTIVE | Noted: 2022-05-10

## 2023-10-17 PROBLEM — K44.9 HIATAL HERNIA: Status: ACTIVE | Noted: 2022-04-12

## 2023-10-17 PROBLEM — M79.644 PAIN OF FINGER OF RIGHT HAND: Status: RESOLVED | Noted: 2022-04-12 | Resolved: 2023-10-17

## 2023-10-17 PROCEDURE — 1036F TOBACCO NON-USER: CPT | Performed by: FAMILY MEDICINE

## 2023-10-17 PROCEDURE — 1123F ACP DISCUSS/DSCN MKR DOCD: CPT | Performed by: FAMILY MEDICINE

## 2023-10-17 PROCEDURE — 1090F PRES/ABSN URINE INCON ASSESS: CPT | Performed by: FAMILY MEDICINE

## 2023-10-17 PROCEDURE — G8484 FLU IMMUNIZE NO ADMIN: HCPCS | Performed by: FAMILY MEDICINE

## 2023-10-17 PROCEDURE — 3017F COLORECTAL CA SCREEN DOC REV: CPT | Performed by: FAMILY MEDICINE

## 2023-10-17 PROCEDURE — G8417 CALC BMI ABV UP PARAM F/U: HCPCS | Performed by: FAMILY MEDICINE

## 2023-10-17 PROCEDURE — 3077F SYST BP >= 140 MM HG: CPT | Performed by: FAMILY MEDICINE

## 2023-10-17 PROCEDURE — 3078F DIAST BP <80 MM HG: CPT | Performed by: FAMILY MEDICINE

## 2023-10-17 PROCEDURE — 2022F DILAT RTA XM EVC RTNOPTHY: CPT | Performed by: FAMILY MEDICINE

## 2023-10-17 PROCEDURE — 3046F HEMOGLOBIN A1C LEVEL >9.0%: CPT | Performed by: FAMILY MEDICINE

## 2023-10-17 PROCEDURE — G8399 PT W/DXA RESULTS DOCUMENT: HCPCS | Performed by: FAMILY MEDICINE

## 2023-10-17 PROCEDURE — 99204 OFFICE O/P NEW MOD 45 MIN: CPT | Performed by: FAMILY MEDICINE

## 2023-10-17 PROCEDURE — G8427 DOCREV CUR MEDS BY ELIG CLIN: HCPCS | Performed by: FAMILY MEDICINE

## 2023-10-17 ASSESSMENT — PATIENT HEALTH QUESTIONNAIRE - PHQ9
SUM OF ALL RESPONSES TO PHQ QUESTIONS 1-9: 0
SUM OF ALL RESPONSES TO PHQ9 QUESTIONS 1 & 2: 0
SUM OF ALL RESPONSES TO PHQ QUESTIONS 1-9: 0
1. LITTLE INTEREST OR PLEASURE IN DOING THINGS: 0
2. FEELING DOWN, DEPRESSED OR HOPELESS: 0

## 2023-10-17 NOTE — ASSESSMENT & PLAN NOTE
Unclear control, changes made today: get workup for top differential cholelithiasis/cystitis, hepatitis/fatty liver, also considering pancreas disease or constipation. will get labs and RUQ imaing with views of pancreas and spleen if possible.

## 2023-10-17 NOTE — ASSESSMENT & PLAN NOTE
Unclear control, continue current medications pending work up below. Foot exam complete. Labs ordered. Ophthalmology referral placed.

## 2023-10-17 NOTE — PROGRESS NOTES
Family Medicine Initial Office Visit  Patient: July Mcdaniels  1955, 80 y.o., female  Encounter Date: 10/17/2023    ASSESSMENT & PLAN  July Mcdaniels is a 80 y.o. female with HLD, HTN, DM, obesity who presented for established care with below concerns:    1. Type 2 diabetes mellitus without complication, without long-term current use of insulin ([address])  Assessment & Plan:   Unclear control, continue current medications pending work up below. Foot exam complete. Labs ordered. Ophthalmology referral placed. 2. Dyslipidemia  Assessment & Plan:   Well-controlled, continue current medications and get annual labs. 3. Class 3 severe obesity due to excess calories with serious comorbidity and body mass index (BMI) of 40.0 to 44.9 in adult ([address])  4. Hazy vision  Assessment & Plan:   Uncontrolled, changes made today: get in with ophthalmology for retinal and lens exam. ddx: cataracts vs corrective lens adjustment vs retinal disease. 5. Right upper quadrant abdominal pain  Assessment & Plan:   Unclear control, changes made today: get workup for top differential cholelithiasis/cystitis, hepatitis/fatty liver, also considering pancreas disease or constipation. will get labs and RUQ imaing with views of pancreas and spleen if possible. 6. Colon cancer screening        Return in about 4 weeks (around 11/14/2023) for abdominal discomfort. There are no Patient Instructions on file for this visit. CHIEF COMPLAINT  Chief Complaint   Patient presents with    New Patient     Np to practice has been having stomach issues \to include epigastric pn after eating. Feels as her stomach is \"Freezing\" noc          SUBJECTIVE  July Mcdaniels is a 80 y.o. female presenting today for establishing care.         Social History     Tobacco Use   Smoking Status Never   Smokeless Tobacco Never     Social History     Substance and Sexual Activity   Alcohol Use Yes     Social History     Substance and Sexual Activity   Drug Use Never

## 2023-10-17 NOTE — ASSESSMENT & PLAN NOTE
Uncontrolled, changes made today: get in with ophthalmology for retinal and lens exam. ddx: cataracts vs corrective lens adjustment vs retinal disease.

## 2023-10-17 NOTE — PROGRESS NOTES
Chief Complaint   Patient presents with    New Patient     Np to practice has been having stomach issues \to include epigastric pn after eating.   Feels as her stomach is \"Freezing\" noc

## 2023-10-26 ENCOUNTER — HOSPITAL ENCOUNTER (OUTPATIENT)
Facility: HOSPITAL | Age: 68
Discharge: HOME | End: 2023-10-26
Attending: FAMILY MEDICINE
Payer: MEDICARE

## 2023-10-26 DIAGNOSIS — R10.11 RIGHT UPPER QUADRANT ABDOMINAL PAIN: ICD-10-CM

## 2023-10-26 PROCEDURE — 76705 ECHO EXAM OF ABDOMEN: CPT

## 2023-11-22 RX ORDER — LOSARTAN POTASSIUM AND HYDROCHLOROTHIAZIDE 25; 100 MG/1; MG/1
TABLET ORAL
Qty: 100 TABLET | Refills: 2 | OUTPATIENT
Start: 2023-11-22

## 2023-11-22 RX ORDER — ATORVASTATIN CALCIUM 20 MG/1
TABLET, FILM COATED ORAL
Qty: 100 TABLET | Refills: 2 | OUTPATIENT
Start: 2023-11-22

## 2023-11-22 RX ORDER — AMLODIPINE BESYLATE 10 MG/1
TABLET ORAL
Qty: 100 TABLET | Refills: 2 | OUTPATIENT
Start: 2023-11-22

## 2023-11-23 ENCOUNTER — CLINICAL DOCUMENTATION (OUTPATIENT)
Age: 68
End: 2023-11-23

## 2023-11-23 NOTE — PROGRESS NOTES
5/11/23  7 day monitor  *The predominant rhythm was sinus bradycardia to sinus tachycardia. *The Maximum Heart Rate recorded was 151 bpm, 06/20 19:07:47, the Minimum Heart Rate recorded  was 54 bpm, 06/25 03:34:34, and the Average Heart Rate was 87 bpm.  *There were 21,030 VE beats with a burden of 3 %. *There were 140 SVE beats with a burden of <1 %. *There were 0 Patient Triggers.

## 2023-11-27 RX ORDER — AMLODIPINE BESYLATE 10 MG/1
10 TABLET ORAL DAILY
Qty: 90 TABLET | Refills: 3 | Status: SHIPPED | OUTPATIENT
Start: 2023-11-27

## 2023-11-27 RX ORDER — ATORVASTATIN CALCIUM 20 MG/1
TABLET, FILM COATED ORAL
Qty: 90 TABLET | Refills: 3 | Status: SHIPPED | OUTPATIENT
Start: 2023-11-27

## 2023-11-27 RX ORDER — LOSARTAN POTASSIUM AND HYDROCHLOROTHIAZIDE 25; 100 MG/1; MG/1
1 TABLET ORAL DAILY
Qty: 90 TABLET | Refills: 3 | Status: SHIPPED | OUTPATIENT
Start: 2023-11-27

## 2023-12-06 DIAGNOSIS — E11.9 TYPE 2 DIABETES MELLITUS WITHOUT COMPLICATION, WITHOUT LONG-TERM CURRENT USE OF INSULIN (HCC): ICD-10-CM

## 2023-12-06 DIAGNOSIS — E78.5 DYSLIPIDEMIA: ICD-10-CM

## 2023-12-06 DIAGNOSIS — R10.11 RIGHT UPPER QUADRANT ABDOMINAL PAIN: ICD-10-CM

## 2023-12-07 LAB
ALBUMIN SERPL-MCNC: 3.9 G/DL (ref 3.5–5)
ALBUMIN/GLOB SERPL: 1 (ref 1.1–2.2)
ALP SERPL-CCNC: 134 U/L (ref 45–117)
ALT SERPL-CCNC: 27 U/L (ref 12–78)
ANION GAP SERPL CALC-SCNC: 5 MMOL/L (ref 5–15)
AST SERPL-CCNC: 18 U/L (ref 15–37)
BILIRUB SERPL-MCNC: 0.5 MG/DL (ref 0.2–1)
BUN SERPL-MCNC: 16 MG/DL (ref 6–20)
BUN/CREAT SERPL: 16 (ref 12–20)
CALCIUM SERPL-MCNC: 9.7 MG/DL (ref 8.5–10.1)
CHLORIDE SERPL-SCNC: 107 MMOL/L (ref 97–108)
CHOLEST SERPL-MCNC: 153 MG/DL
CO2 SERPL-SCNC: 30 MMOL/L (ref 21–32)
CREAT SERPL-MCNC: 1.01 MG/DL (ref 0.55–1.02)
CREAT UR-MCNC: 37.5 MG/DL
ERYTHROCYTE [DISTWIDTH] IN BLOOD BY AUTOMATED COUNT: 13.2 % (ref 11.5–14.5)
EST. AVERAGE GLUCOSE BLD GHB EST-MCNC: 114 MG/DL
GLOBULIN SER CALC-MCNC: 3.8 G/DL (ref 2–4)
GLUCOSE SERPL-MCNC: 126 MG/DL (ref 65–100)
HBA1C MFR BLD: 5.6 % (ref 4–5.6)
HCT VFR BLD AUTO: 38.7 % (ref 35–47)
HDLC SERPL-MCNC: 62 MG/DL
HDLC SERPL: 2.5 (ref 0–5)
HGB BLD-MCNC: 12 G/DL (ref 11.5–16)
LDLC SERPL CALC-MCNC: 71 MG/DL (ref 0–100)
LIPASE SERPL-CCNC: 38 U/L (ref 13–75)
MCH RBC QN AUTO: 29.9 PG (ref 26–34)
MCHC RBC AUTO-ENTMCNC: 31 G/DL (ref 30–36.5)
MCV RBC AUTO: 96.5 FL (ref 80–99)
MICROALBUMIN UR-MCNC: <0.5 MG/DL
MICROALBUMIN/CREAT UR-RTO: <13 MG/G (ref 0–30)
NRBC # BLD: 0 K/UL (ref 0–0.01)
NRBC BLD-RTO: 0 PER 100 WBC
PLATELET # BLD AUTO: 304 K/UL (ref 150–400)
PMV BLD AUTO: 12.4 FL (ref 8.9–12.9)
POTASSIUM SERPL-SCNC: 3.8 MMOL/L (ref 3.5–5.1)
PROT SERPL-MCNC: 7.7 G/DL (ref 6.4–8.2)
RBC # BLD AUTO: 4.01 M/UL (ref 3.8–5.2)
SODIUM SERPL-SCNC: 142 MMOL/L (ref 136–145)
TRIGL SERPL-MCNC: 100 MG/DL
VLDLC SERPL CALC-MCNC: 20 MG/DL
WBC # BLD AUTO: 5.9 K/UL (ref 3.6–11)

## 2023-12-12 ENCOUNTER — PATIENT MESSAGE (OUTPATIENT)
Facility: CLINIC | Age: 68
End: 2023-12-12

## 2023-12-12 DIAGNOSIS — R74.8 ALKALINE PHOSPHATASE ELEVATION: Primary | ICD-10-CM

## 2023-12-12 PROBLEM — E78.49 OTHER HYPERLIPIDEMIA: Status: ACTIVE | Noted: 2022-04-12

## 2023-12-12 PROBLEM — M25.562 CHRONIC PAIN OF BOTH KNEES: Status: ACTIVE | Noted: 2022-04-12

## 2023-12-12 PROBLEM — K64.4 EXTERNAL HEMORRHOID: Status: ACTIVE | Noted: 2022-10-21

## 2023-12-12 PROBLEM — M85.80 OSTEOPENIA: Status: ACTIVE | Noted: 2023-02-20

## 2023-12-12 PROBLEM — K76.0 HEPATIC STEATOSIS: Status: ACTIVE | Noted: 2022-04-12

## 2023-12-12 PROBLEM — M79.644 PAIN OF FINGER OF RIGHT HAND: Status: ACTIVE | Noted: 2022-04-12

## 2023-12-12 PROBLEM — M25.561 CHRONIC PAIN OF BOTH KNEES: Status: ACTIVE | Noted: 2022-04-12

## 2023-12-12 PROBLEM — I10 PRIMARY HYPERTENSION: Status: ACTIVE | Noted: 2022-04-12

## 2023-12-12 PROBLEM — N28.1 RENAL CYST, RIGHT: Status: ACTIVE | Noted: 2023-12-12

## 2023-12-12 PROBLEM — G89.29 CHRONIC PAIN OF BOTH KNEES: Status: ACTIVE | Noted: 2022-04-12

## 2023-12-12 PROBLEM — N89.8 VAGINAL DISCHARGE: Status: ACTIVE | Noted: 2022-07-15

## 2023-12-12 PROBLEM — M72.2 PLANTAR FASCIITIS: Status: ACTIVE | Noted: 2022-04-12

## 2023-12-12 PROBLEM — R20.0 NUMBNESS: Status: ACTIVE | Noted: 2022-07-15

## 2024-02-14 ENCOUNTER — PATIENT MESSAGE (OUTPATIENT)
Facility: CLINIC | Age: 69
End: 2024-02-14

## 2024-02-14 DIAGNOSIS — E11.9 TYPE 2 DIABETES MELLITUS WITHOUT COMPLICATION, WITHOUT LONG-TERM CURRENT USE OF INSULIN (HCC): ICD-10-CM

## 2024-02-14 NOTE — TELEPHONE ENCOUNTER
From: Flakita Grande  Sent: 2/14/2024 8:52 AM EST  To: Adria Redd [facility] Clinical Staff  Subject: Need Meds refilled     My refills are delivered automatically through [facility]. I received a message from them saying my metformen refill is oh hold because they have not been able to get in touch with Dr. Brown. My medications are on file.

## 2024-02-15 RX ORDER — METFORMIN HYDROCHLORIDE 500 MG/1
500 TABLET, EXTENDED RELEASE ORAL 2 TIMES DAILY WITH MEALS
Qty: 180 TABLET | Refills: 3 | Status: SHIPPED | OUTPATIENT
Start: 2024-02-15

## 2024-02-26 SDOH — ECONOMIC STABILITY: FOOD INSECURITY: WITHIN THE PAST 12 MONTHS, THE FOOD YOU BOUGHT JUST DIDN'T LAST AND YOU DIDN'T HAVE MONEY TO GET MORE.: NEVER TRUE

## 2024-02-26 SDOH — ECONOMIC STABILITY: INCOME INSECURITY: HOW HARD IS IT FOR YOU TO PAY FOR THE VERY BASICS LIKE FOOD, HOUSING, MEDICAL CARE, AND HEATING?: NOT HARD AT ALL

## 2024-02-26 SDOH — ECONOMIC STABILITY: HOUSING INSECURITY
IN THE LAST 12 MONTHS, WAS THERE A TIME WHEN YOU DID NOT HAVE A STEADY PLACE TO SLEEP OR SLEPT IN A SHELTER (INCLUDING NOW)?: NO

## 2024-02-26 SDOH — ECONOMIC STABILITY: FOOD INSECURITY: WITHIN THE PAST 12 MONTHS, YOU WORRIED THAT YOUR FOOD WOULD RUN OUT BEFORE YOU GOT MONEY TO BUY MORE.: NEVER TRUE

## 2024-02-26 SDOH — ECONOMIC STABILITY: TRANSPORTATION INSECURITY
IN THE PAST 12 MONTHS, HAS LACK OF TRANSPORTATION KEPT YOU FROM MEETINGS, WORK, OR FROM GETTING THINGS NEEDED FOR DAILY LIVING?: YES

## 2024-02-29 ENCOUNTER — TELEMEDICINE (OUTPATIENT)
Facility: CLINIC | Age: 69
End: 2024-02-29

## 2024-02-29 DIAGNOSIS — G89.29 CHRONIC PAIN OF BOTH KNEES: ICD-10-CM

## 2024-02-29 DIAGNOSIS — R20.2 NUMBNESS AND TINGLING OF LEFT UPPER EXTREMITY: ICD-10-CM

## 2024-02-29 DIAGNOSIS — M25.561 CHRONIC PAIN OF BOTH KNEES: ICD-10-CM

## 2024-02-29 DIAGNOSIS — E66.01 OBESITY, CLASS III, BMI 40-49.9 (MORBID OBESITY) (HCC): ICD-10-CM

## 2024-02-29 DIAGNOSIS — R20.0 FACIAL NUMBNESS: ICD-10-CM

## 2024-02-29 DIAGNOSIS — R20.2 NUMBNESS AND TINGLING OF BOTH FEET: Primary | ICD-10-CM

## 2024-02-29 DIAGNOSIS — M25.562 CHRONIC PAIN OF BOTH KNEES: ICD-10-CM

## 2024-02-29 DIAGNOSIS — R20.0 NUMBNESS AND TINGLING OF LEFT UPPER EXTREMITY: ICD-10-CM

## 2024-02-29 DIAGNOSIS — R20.0 NUMBNESS AND TINGLING OF BOTH FEET: Primary | ICD-10-CM

## 2024-02-29 PROBLEM — M79.644 PAIN OF FINGER OF RIGHT HAND: Status: RESOLVED | Noted: 2022-04-12 | Resolved: 2024-02-29

## 2024-02-29 NOTE — PROGRESS NOTES
Flakita Grande, was evaluated through a synchronous (real-time) audio-video encounter. The patient (or guardian if applicable) is aware that this is a billable service, which includes applicable co-pays. This Virtual Visit was conducted with patient's (and/or legal guardian's) consent. Patient identification was verified, and a caregiver was present when appropriate.   The patient was located at Home: [address]  [facility] [address]  Provider was located at Facility (Appt Dept): [address] [facility]  [address]  [place] [address]      Flakita Grande (:  1955) is a Established patient, presenting virtually for evaluation of the following:    Assessment & Plan   Below is the assessment and plan developed based on review of pertinent history, physical exam, labs, studies, and medications.  1. Numbness and tingling of both feet  -     [facility] - Demetrius Galarza MD, Neurology, [place]  -     MRI BRAIN W WO CONTRAST; Future  2. Obesity, Class III, BMI 40-49.9 (morbid obesity) (HCC)  3. Chronic pain of both knees  -     [facility] - Paresh Coy MD, Orthopedic Surgery, (knee), [place]  4. Numbness and tingling of left upper extremity  -     [facility] - Demetrius Galarza MD, Neurology, [place]  -     MRI BRAIN W WO CONTRAST; Future  5. Facial numbness  -     [facility] - Demetrius Galarza MD, Neurology, [place]  -     MRI BRAIN W WO CONTRAST; Future  Likely, feet numbness is sensation from plantar faciitis treatment, or diabetic neuropathy, but unclear at this time. Recommend EMG.    Likely leg numbness/pain is radiculopathy/spinal stenosis given positional quality, though the predominance of numbness and occasional association with other focal findings warrants further eval with EMG.    Knee pain may be contributing to the low back troubles and subsequent ?radicular symptoms.    Facial numbness seems likely to be stress response, as stroke/tia normally is asymmetric and spares forehead. 
No

## 2024-03-15 ENCOUNTER — HOSPITAL ENCOUNTER (OUTPATIENT)
Facility: HOSPITAL | Age: 69
End: 2024-03-15
Attending: FAMILY MEDICINE
Payer: MEDICARE

## 2024-03-15 VITALS — BODY MASS INDEX: 39.48 KG/M2 | WEIGHT: 230 LBS

## 2024-03-15 DIAGNOSIS — R20.0 FACIAL NUMBNESS: ICD-10-CM

## 2024-03-15 DIAGNOSIS — R20.2 NUMBNESS AND TINGLING OF BOTH FEET: ICD-10-CM

## 2024-03-15 DIAGNOSIS — R20.0 NUMBNESS AND TINGLING OF LEFT UPPER EXTREMITY: ICD-10-CM

## 2024-03-15 DIAGNOSIS — R20.2 NUMBNESS AND TINGLING OF LEFT UPPER EXTREMITY: ICD-10-CM

## 2024-03-15 DIAGNOSIS — R20.0 NUMBNESS AND TINGLING OF BOTH FEET: ICD-10-CM

## 2024-03-15 PROCEDURE — 6360000004 HC RX CONTRAST MEDICATION: Performed by: RADIOLOGY

## 2024-03-15 PROCEDURE — A9579 GAD-BASE MR CONTRAST NOS,1ML: HCPCS | Performed by: RADIOLOGY

## 2024-03-15 PROCEDURE — 70553 MRI BRAIN STEM W/O & W/DYE: CPT

## 2024-03-15 RX ADMIN — GADOTERIDOL 20 ML: 279.3 INJECTION, SOLUTION INTRAVENOUS at 10:49

## 2024-03-19 NOTE — RESULT ENCOUNTER NOTE
Ms. Grande, I am assisting in covering Dr. Brown's patients while he is out of the clinic until tomorrow. We received your brain MRI today and it appears normal. He will see you to continue discussion on 4/25. Many thanks, Anjana Dickson [facility]

## 2024-04-22 SDOH — HEALTH STABILITY: PHYSICAL HEALTH: ON AVERAGE, HOW MANY DAYS PER WEEK DO YOU ENGAGE IN MODERATE TO STRENUOUS EXERCISE (LIKE A BRISK WALK)?: 0 DAYS

## 2024-04-22 ASSESSMENT — PATIENT HEALTH QUESTIONNAIRE - PHQ9
SUM OF ALL RESPONSES TO PHQ9 QUESTIONS 1 & 2: 1
1. LITTLE INTEREST OR PLEASURE IN DOING THINGS: SEVERAL DAYS
SUM OF ALL RESPONSES TO PHQ QUESTIONS 1-9: 1
SUM OF ALL RESPONSES TO PHQ QUESTIONS 1-9: 1
2. FEELING DOWN, DEPRESSED OR HOPELESS: NOT AT ALL
SUM OF ALL RESPONSES TO PHQ QUESTIONS 1-9: 1
SUM OF ALL RESPONSES TO PHQ QUESTIONS 1-9: 1

## 2024-04-22 ASSESSMENT — LIFESTYLE VARIABLES
HOW OFTEN DO YOU HAVE A DRINK CONTAINING ALCOHOL: NEVER
HOW MANY STANDARD DRINKS CONTAINING ALCOHOL DO YOU HAVE ON A TYPICAL DAY: PATIENT DOES NOT DRINK
HOW MANY STANDARD DRINKS CONTAINING ALCOHOL DO YOU HAVE ON A TYPICAL DAY: 0
HOW OFTEN DO YOU HAVE A DRINK CONTAINING ALCOHOL: 1
HOW OFTEN DO YOU HAVE SIX OR MORE DRINKS ON ONE OCCASION: 1

## 2024-04-25 ENCOUNTER — OFFICE VISIT (OUTPATIENT)
Facility: CLINIC | Age: 69
End: 2024-04-25
Payer: COMMERCIAL

## 2024-04-25 VITALS
DIASTOLIC BLOOD PRESSURE: 80 MMHG | HEART RATE: 97 BPM | TEMPERATURE: 97.2 F | RESPIRATION RATE: 20 BRPM | SYSTOLIC BLOOD PRESSURE: 130 MMHG | WEIGHT: 240 LBS | OXYGEN SATURATION: 97 % | BODY MASS INDEX: 40.97 KG/M2 | HEIGHT: 64 IN

## 2024-04-25 DIAGNOSIS — M75.41 ROTATOR CUFF IMPINGEMENT SYNDROME OF RIGHT SHOULDER: ICD-10-CM

## 2024-04-25 DIAGNOSIS — E11.9 TYPE 2 DIABETES MELLITUS WITHOUT COMPLICATION, WITHOUT LONG-TERM CURRENT USE OF INSULIN (HCC): ICD-10-CM

## 2024-04-25 DIAGNOSIS — L30.4 INTERTRIGO: ICD-10-CM

## 2024-04-25 DIAGNOSIS — E78.5 DYSLIPIDEMIA: ICD-10-CM

## 2024-04-25 DIAGNOSIS — Z12.11 COLON CANCER SCREENING: ICD-10-CM

## 2024-04-25 DIAGNOSIS — R74.8 ALKALINE PHOSPHATASE ELEVATION: ICD-10-CM

## 2024-04-25 DIAGNOSIS — I10 PRIMARY HYPERTENSION: ICD-10-CM

## 2024-04-25 DIAGNOSIS — Z12.31 BREAST CANCER SCREENING BY MAMMOGRAM: ICD-10-CM

## 2024-04-25 DIAGNOSIS — E55.9 VITAMIN D DEFICIENCY: ICD-10-CM

## 2024-04-25 DIAGNOSIS — Z00.00 MEDICARE ANNUAL WELLNESS VISIT, SUBSEQUENT: Primary | ICD-10-CM

## 2024-04-25 DIAGNOSIS — K76.0 HEPATIC STEATOSIS: ICD-10-CM

## 2024-04-25 PROBLEM — L30.9 DERMATITIS: Status: RESOLVED | Noted: 2022-09-15 | Resolved: 2024-04-25

## 2024-04-25 PROCEDURE — G8399 PT W/DXA RESULTS DOCUMENT: HCPCS | Performed by: FAMILY MEDICINE

## 2024-04-25 PROCEDURE — G8417 CALC BMI ABV UP PARAM F/U: HCPCS | Performed by: FAMILY MEDICINE

## 2024-04-25 PROCEDURE — 2022F DILAT RTA XM EVC RTNOPTHY: CPT | Performed by: FAMILY MEDICINE

## 2024-04-25 PROCEDURE — 3017F COLORECTAL CA SCREEN DOC REV: CPT | Performed by: FAMILY MEDICINE

## 2024-04-25 PROCEDURE — 3046F HEMOGLOBIN A1C LEVEL >9.0%: CPT | Performed by: FAMILY MEDICINE

## 2024-04-25 PROCEDURE — 3079F DIAST BP 80-89 MM HG: CPT | Performed by: FAMILY MEDICINE

## 2024-04-25 PROCEDURE — G0439 PPPS, SUBSEQ VISIT: HCPCS | Performed by: FAMILY MEDICINE

## 2024-04-25 PROCEDURE — 1090F PRES/ABSN URINE INCON ASSESS: CPT | Performed by: FAMILY MEDICINE

## 2024-04-25 PROCEDURE — 3075F SYST BP GE 130 - 139MM HG: CPT | Performed by: FAMILY MEDICINE

## 2024-04-25 PROCEDURE — G8427 DOCREV CUR MEDS BY ELIG CLIN: HCPCS | Performed by: FAMILY MEDICINE

## 2024-04-25 PROCEDURE — 1036F TOBACCO NON-USER: CPT | Performed by: FAMILY MEDICINE

## 2024-04-25 PROCEDURE — 99214 OFFICE O/P EST MOD 30 MIN: CPT | Performed by: FAMILY MEDICINE

## 2024-04-25 PROCEDURE — 1123F ACP DISCUSS/DSCN MKR DOCD: CPT | Performed by: FAMILY MEDICINE

## 2024-04-25 RX ORDER — TRIAMCINOLONE ACETONIDE 1 MG/G
CREAM TOPICAL 2 TIMES DAILY PRN
Status: CANCELLED | OUTPATIENT
Start: 2024-04-25

## 2024-04-25 RX ORDER — KETOCONAZOLE 20 MG/G
CREAM TOPICAL DAILY
Qty: 30 G | Refills: 1 | Status: SHIPPED | OUTPATIENT
Start: 2024-04-25

## 2024-04-25 RX ORDER — NYSTATIN 100000 [USP'U]/G
POWDER TOPICAL
Qty: 60 G | Refills: 1 | Status: SHIPPED | OUTPATIENT
Start: 2024-04-25

## 2024-04-25 NOTE — PROGRESS NOTES
appointment with their eye specialist    Safety:  Do you have any tripping hazards - loose or unsecured carpets or rugs?: (!) Yes  Do you have non-slip mats or non-slip surfaces or shower bars or grab bars in your shower or bathtub?: (!) No  Interventions:  See AVS for additional education material                   Objective   Vitals:    04/25/24 1027   BP: 130/80   Pulse: 97   Resp: 20   Temp: 97.2 °F (36.2 °C)   SpO2: 97%   Weight: 108.9 kg (240 lb)   Height: 1.626 m (5' 4\")      Body mass index is 41.2 kg/m².               No Known Allergies  Prior to Visit Medications    Medication Sig Taking? Authorizing Provider   ketoconazole (NIZORAL) 2 % cream Apply topically daily Yes Fernando Brown MD   nystatin (MYCOSTATIN) 481922 UNIT/GM powder Apply 3 times daily. Yes Fernando Brown MD   metFORMIN (GLUCOPHAGE-XR) 500 MG extended release tablet Take 1 tablet by mouth 2 times daily (with meals) Yes Fernando Brown MD   losartan-hydroCHLOROthiazide (HYZAAR) 100-25 MG per tablet Take 1 tablet by mouth daily for high blood pressure Yes Fernando Brown MD   atorvastatin (LIPITOR) 20 MG tablet TAKE 1 TABLET BY MOUTH  DAILY FOR HIGH CHOLESTEROL Yes Fernando Brown MD   amLODIPine (NORVASC) 10 MG tablet Take 1 tablet by mouth daily for high blood pressure Yes Fernando Brown MD   diclofenac (VOLTAREN) 75 MG EC tablet TAKE 1 TABLET BY MOUTH  TWICE DAILY AS NEEDED FOR  PAIN Yes Lore Forrester MD   vitamin D (CHOLECALCIFEROL) 25 MCG (1000 UT) TABS tablet Take by mouth daily Yes Automatic Reconciliation, Ar   triamcinolone (KENALOG) 0.1 % cream Apply topically 2 times daily as needed Yes Automatic Reconciliation, Ar       CareTeam (Including outside providers/suppliers regularly involved in providing care):   Patient Care Team:  Fernando Brown MD as PCP - General (Family Medicine)  Fernando Brown MD as PCP - Empaneled Provider     Reviewed and updated this visit:  Allergies  Meds  Med Hx  Surg Hx  Soc Hx  Fam Hx

## 2024-04-25 NOTE — PATIENT INSTRUCTIONS
feeling in the chest.     Sweating.     Shortness of breath.     Pain, pressure, or a strange feeling in the back, neck, jaw, or upper belly or in one or both shoulders or arms.     Lightheadedness or sudden weakness.     A fast or irregular heartbeat.   After you call 911, the  may tell you to chew 1 adult-strength or 2 to 4 low-dose aspirin. Wait for an ambulance. Do not try to drive yourself.  Watch closely for changes in your health, and be sure to contact your doctor if you have any problems.  Where can you learn more?  Go to https://www.Editlite.net/patientEd and enter F075 to learn more about \"A Healthy Heart: Care Instructions.\"  Current as of: June 24, 2023               Content Version: 14.0  © 3980-9502 [employer].   Care instructions adapted under license by [employer]. If you have questions about a medical condition or this instruction, always ask your healthcare professional. [employer] disclaims any warranty or liability for your use of this information.      Personalized Preventive Plan for Flakita Grande - 4/25/2024  Medicare offers a range of preventive health benefits. Some of the tests and screenings are paid in full while other may be subject to a deductible, co-insurance, and/or copay.    Some of these benefits include a comprehensive review of your medical history including lifestyle, illnesses that may run in your family, and various assessments and screenings as appropriate.    After reviewing your medical record and screening and assessments performed today your provider may have ordered immunizations, labs, imaging, and/or referrals for you.  A list of these orders (if applicable) as well as your Preventive Care list are included within your After Visit Summary for your review.    Other Preventive Recommendations:    A preventive eye exam performed by an eye specialist is recommended every 1-2 years to screen for glaucoma; cataracts, macular

## 2024-04-26 ENCOUNTER — TELEPHONE (OUTPATIENT)
Age: 69
End: 2024-04-26

## 2024-04-26 NOTE — TELEPHONE ENCOUNTER
LM to reschedule 10/18 appt with Dr uRssell, provider out of the office. Please schedule to next available.

## 2024-06-20 ENCOUNTER — HOSPITAL ENCOUNTER (OUTPATIENT)
Facility: HOSPITAL | Age: 69
Discharge: HOME | End: 2024-06-20
Payer: MEDICARE

## 2024-06-20 VITALS — WEIGHT: 240 LBS | BODY MASS INDEX: 40.97 KG/M2 | HEIGHT: 64 IN

## 2024-06-20 DIAGNOSIS — Z12.31 BREAST CANCER SCREENING BY MAMMOGRAM: ICD-10-CM

## 2024-06-20 DIAGNOSIS — Z00.00 MEDICARE ANNUAL WELLNESS VISIT, SUBSEQUENT: ICD-10-CM

## 2024-06-20 DIAGNOSIS — L30.4 INTERTRIGO: ICD-10-CM

## 2024-06-20 PROCEDURE — 77063 BREAST TOMOSYNTHESIS BI: CPT

## 2024-06-20 RX ORDER — KETOCONAZOLE 20 MG/G
CREAM TOPICAL DAILY
Qty: 30 G | Refills: 1 | Status: SHIPPED | OUTPATIENT
Start: 2024-06-20

## 2024-08-07 RX ORDER — ATORVASTATIN CALCIUM 20 MG/1
TABLET, FILM COATED ORAL
Qty: 100 TABLET | Refills: 2 | Status: SHIPPED | OUTPATIENT
Start: 2024-08-07

## 2024-08-07 RX ORDER — LOSARTAN POTASSIUM AND HYDROCHLOROTHIAZIDE 25; 100 MG/1; MG/1
1 TABLET ORAL DAILY
Qty: 100 TABLET | Refills: 2 | Status: SHIPPED | OUTPATIENT
Start: 2024-08-07

## 2024-08-07 RX ORDER — AMLODIPINE BESYLATE 10 MG/1
10 TABLET ORAL DAILY
Qty: 100 TABLET | Refills: 2 | Status: SHIPPED | OUTPATIENT
Start: 2024-08-07

## 2024-08-07 NOTE — TELEPHONE ENCOUNTER
PCP: Fernando Brown MD    Last appt: 2/20/2023   Future Appointments   Date Time Provider Department Center   10/10/2024 10:30 AM Fernando Brown MD [facility]       Requested Prescriptions     Pending Prescriptions Disp Refills    losartan-hydroCHLOROthiazide (HYZAAR) 100-25 MG per tablet [Pharmacy Med Name: Losartan Potassium-HCTZ 100-25 MG Oral Tablet] 100 tablet 2     Sig: TAKE 1 TABLET BY MOUTH DAILY FOR HIGH BLOOD PRESSURE    atorvastatin (LIPITOR) 20 MG tablet [Pharmacy Med Name: Atorvastatin Calcium 20 MG Oral Tablet] 100 tablet 2     Sig: TAKE 1 TABLET BY MOUTH DAILY FOR HIGH CHOLESTEROL    amLODIPine (NORVASC) 10 MG tablet [Pharmacy Med Name: amLODIPine Besylate 10 MG Oral Tablet] 100 tablet 2     Sig: TAKE 1 TABLET BY MOUTH DAILY FOR HIGH BLOOD PRESSURE       Rufina \"[place]\" MAUREEN Reyes

## 2024-10-08 LAB — HBA1C MFR BLD HPLC: 5.8 %

## 2025-01-15 LAB
ESTIMATED AVERAGE GLUCOSE: NORMAL
HBA1C MFR BLD: 6 %

## 2025-03-10 ENCOUNTER — COMMUNITY OUTREACH (OUTPATIENT)
Facility: CLINIC | Age: 70
End: 2025-03-10

## 2025-03-10 NOTE — PROGRESS NOTES
Patient's HM shows they are overdue for A1C and Colorectal Screening.   Care Everywhere and  files searched.   updated with 2018 Sigmoidoscopy and Colonoscopy and 2025 A1C.